# Patient Record
Sex: FEMALE | Race: WHITE | Employment: UNEMPLOYED | URBAN - METROPOLITAN AREA
[De-identification: names, ages, dates, MRNs, and addresses within clinical notes are randomized per-mention and may not be internally consistent; named-entity substitution may affect disease eponyms.]

---

## 2017-02-13 ENCOUNTER — OFFICE VISIT (OUTPATIENT)
Dept: FAMILY MEDICINE CLINIC | Age: 67
End: 2017-02-13

## 2017-02-13 VITALS
SYSTOLIC BLOOD PRESSURE: 130 MMHG | WEIGHT: 192.4 LBS | DIASTOLIC BLOOD PRESSURE: 70 MMHG | TEMPERATURE: 98.1 F | BODY MASS INDEX: 33.55 KG/M2 | HEART RATE: 101 BPM

## 2017-02-13 DIAGNOSIS — L03.116 CELLULITIS OF LEFT LOWER EXTREMITY: Primary | ICD-10-CM

## 2017-02-13 DIAGNOSIS — I87.2 CHRONIC VENOUS INSUFFICIENCY: ICD-10-CM

## 2017-02-13 PROCEDURE — 99213 OFFICE O/P EST LOW 20 MIN: CPT | Performed by: FAMILY MEDICINE

## 2017-02-13 RX ORDER — FLUCONAZOLE 150 MG/1
150 TABLET ORAL ONCE
Qty: 1 TABLET | Refills: 0 | Status: SHIPPED | OUTPATIENT
Start: 2017-02-13 | End: 2017-02-13

## 2017-02-13 RX ORDER — CLOTRIMAZOLE AND BETAMETHASONE DIPROPIONATE 10; .64 MG/G; MG/G
CREAM TOPICAL 2 TIMES DAILY
COMMUNITY
End: 2018-02-23

## 2017-02-13 ASSESSMENT — ENCOUNTER SYMPTOMS
DIARRHEA: 0
EYES NEGATIVE: 1
SHORTNESS OF BREATH: 0
ABDOMINAL PAIN: 0
CHEST TIGHTNESS: 0

## 2017-05-24 RX ORDER — AMITRIPTYLINE HYDROCHLORIDE 25 MG/1
TABLET, FILM COATED ORAL
Qty: 90 TABLET | Refills: 2 | Status: SHIPPED | OUTPATIENT
Start: 2017-05-24 | End: 2018-02-18 | Stop reason: SDUPTHER

## 2017-06-23 ENCOUNTER — OFFICE VISIT (OUTPATIENT)
Dept: FAMILY MEDICINE CLINIC | Age: 67
End: 2017-06-23

## 2017-06-23 VITALS
BODY MASS INDEX: 32.95 KG/M2 | DIASTOLIC BLOOD PRESSURE: 70 MMHG | WEIGHT: 189 LBS | SYSTOLIC BLOOD PRESSURE: 118 MMHG | HEART RATE: 105 BPM | OXYGEN SATURATION: 98 %

## 2017-06-23 DIAGNOSIS — L03.116 CELLULITIS OF LEFT LOWER EXTREMITY: Primary | ICD-10-CM

## 2017-06-23 PROCEDURE — 99213 OFFICE O/P EST LOW 20 MIN: CPT | Performed by: FAMILY MEDICINE

## 2017-06-23 RX ORDER — CIPROFLOXACIN 500 MG/1
500 TABLET, FILM COATED ORAL 2 TIMES DAILY
Qty: 20 TABLET | Refills: 0 | Status: SHIPPED | OUTPATIENT
Start: 2017-06-23 | End: 2017-07-03

## 2017-06-23 RX ORDER — FLUCONAZOLE 200 MG/1
200 TABLET ORAL EVERY OTHER DAY
Qty: 2 TABLET | Refills: 0 | Status: SHIPPED | OUTPATIENT
Start: 2017-06-23 | End: 2017-06-26

## 2017-06-26 LAB
GRAM STAIN RESULT: ABNORMAL
ORGANISM: ABNORMAL
WOUND/ABSCESS: ABNORMAL
WOUND/ABSCESS: ABNORMAL

## 2017-09-06 RX ORDER — FUROSEMIDE 40 MG/1
TABLET ORAL
Qty: 90 TABLET | Refills: 1 | Status: SHIPPED | OUTPATIENT
Start: 2017-09-06 | End: 2018-02-23 | Stop reason: SDUPTHER

## 2018-02-19 RX ORDER — AMITRIPTYLINE HYDROCHLORIDE 25 MG/1
TABLET, FILM COATED ORAL
Qty: 90 TABLET | Refills: 2 | Status: SHIPPED | OUTPATIENT
Start: 2018-02-19 | End: 2018-02-23 | Stop reason: SDUPTHER

## 2018-02-19 NOTE — TELEPHONE ENCOUNTER
Last seen 6/23/2017  No return date  No future appointment  Last labs 5/11/2016      Left message for the patient to call back to schedule an appointment

## 2018-02-23 ENCOUNTER — OFFICE VISIT (OUTPATIENT)
Dept: FAMILY MEDICINE CLINIC | Age: 68
End: 2018-02-23

## 2018-02-23 VITALS
DIASTOLIC BLOOD PRESSURE: 78 MMHG | BODY MASS INDEX: 32.05 KG/M2 | OXYGEN SATURATION: 97 % | WEIGHT: 183.8 LBS | SYSTOLIC BLOOD PRESSURE: 116 MMHG | HEART RATE: 95 BPM

## 2018-02-23 DIAGNOSIS — R73.09 HIGH GLUCOSE: ICD-10-CM

## 2018-02-23 DIAGNOSIS — M54.2 NECK PAIN: Primary | ICD-10-CM

## 2018-02-23 DIAGNOSIS — I87.2 CHRONIC VENOUS INSUFFICIENCY: ICD-10-CM

## 2018-02-23 DIAGNOSIS — M25.511 CHRONIC RIGHT SHOULDER PAIN: ICD-10-CM

## 2018-02-23 DIAGNOSIS — K21.9 GASTROESOPHAGEAL REFLUX DISEASE WITHOUT ESOPHAGITIS: ICD-10-CM

## 2018-02-23 DIAGNOSIS — G89.29 CHRONIC RIGHT SHOULDER PAIN: ICD-10-CM

## 2018-02-23 DIAGNOSIS — K13.79 MOUTH SORES: ICD-10-CM

## 2018-02-23 DIAGNOSIS — E55.9 VITAMIN D DEFICIENCY: ICD-10-CM

## 2018-02-23 DIAGNOSIS — G89.29 CHRONIC LOW BACK PAIN WITHOUT SCIATICA, UNSPECIFIED BACK PAIN LATERALITY: ICD-10-CM

## 2018-02-23 DIAGNOSIS — M54.50 CHRONIC LOW BACK PAIN WITHOUT SCIATICA, UNSPECIFIED BACK PAIN LATERALITY: ICD-10-CM

## 2018-02-23 DIAGNOSIS — Z13.220 LIPID SCREENING: ICD-10-CM

## 2018-02-23 DIAGNOSIS — R60.9 PERIPHERAL EDEMA: ICD-10-CM

## 2018-02-23 DIAGNOSIS — R32 URINARY INCONTINENCE, UNSPECIFIED TYPE: ICD-10-CM

## 2018-02-23 PROCEDURE — G8510 SCR DEP NEG, NO PLAN REQD: HCPCS | Performed by: FAMILY MEDICINE

## 2018-02-23 PROCEDURE — 99214 OFFICE O/P EST MOD 30 MIN: CPT | Performed by: FAMILY MEDICINE

## 2018-02-23 PROCEDURE — 3288F FALL RISK ASSESSMENT DOCD: CPT | Performed by: FAMILY MEDICINE

## 2018-02-23 RX ORDER — AMITRIPTYLINE HYDROCHLORIDE 25 MG/1
TABLET, FILM COATED ORAL
Qty: 90 TABLET | Refills: 2 | Status: SHIPPED | OUTPATIENT
Start: 2018-02-23 | End: 2018-11-18 | Stop reason: SDUPTHER

## 2018-02-23 RX ORDER — DESONIDE 0.5 MG/G
OINTMENT TOPICAL 2 TIMES DAILY
Qty: 2 TUBE | Refills: 3 | Status: SHIPPED | OUTPATIENT
Start: 2018-02-23

## 2018-02-23 RX ORDER — DESONIDE 0.5 MG/G
OINTMENT TOPICAL 2 TIMES DAILY PRN
COMMUNITY
End: 2018-02-23 | Stop reason: SDUPTHER

## 2018-02-23 RX ORDER — GABAPENTIN 600 MG/1
TABLET ORAL
Qty: 180 TABLET | Refills: 3 | Status: SHIPPED | OUTPATIENT
Start: 2018-02-23 | End: 2019-06-10 | Stop reason: SDUPTHER

## 2018-02-23 RX ORDER — OXYBUTYNIN CHLORIDE 10 MG/1
TABLET, EXTENDED RELEASE ORAL
Qty: 90 TABLET | Refills: 2 | Status: SHIPPED | OUTPATIENT
Start: 2018-02-23 | End: 2018-11-18 | Stop reason: SDUPTHER

## 2018-02-23 RX ORDER — FUROSEMIDE 40 MG/1
TABLET ORAL
Qty: 90 TABLET | Refills: 2 | Status: SHIPPED | OUTPATIENT
Start: 2018-02-23 | End: 2018-11-18 | Stop reason: SDUPTHER

## 2018-02-23 ASSESSMENT — ENCOUNTER SYMPTOMS
SHORTNESS OF BREATH: 0
COUGH: 0
ROS SKIN COMMENTS: CHRONIC VENOUS INSUFFICIENCY
CHEST TIGHTNESS: 0
ALLERGIC/IMMUNOLOGIC NEGATIVE: 1
GASTROINTESTINAL NEGATIVE: 1
RESPIRATORY NEGATIVE: 1
EYES NEGATIVE: 1

## 2018-02-23 ASSESSMENT — PATIENT HEALTH QUESTIONNAIRE - PHQ9
1. LITTLE INTEREST OR PLEASURE IN DOING THINGS: 0
2. FEELING DOWN, DEPRESSED OR HOPELESS: 0
SUM OF ALL RESPONSES TO PHQ9 QUESTIONS 1 & 2: 0
SUM OF ALL RESPONSES TO PHQ QUESTIONS 1-9: 0

## 2018-02-23 NOTE — PROGRESS NOTES
Subjective:      Patient ID: Alie De La Paz is a 78 y.o. female. Chief Complaint   Patient presents with    Neck Pain     right side x 6 months    Medication Refill    Shoulder Pain     RT       HPI  RT shoulder & neck area pain x several month  No injury  Hx spinal stenosis back ,stable  Past Medical History:   Diagnosis Date    Breast cancer ([facility] [address] 2008    RT     H/O gastroesophageal reflux (GERD)      Past Surgical History:   Procedure Laterality Date    BREAST SURGERY  2008    Right     CARPAL TUNNEL RELEASE  8/28/12    Left    CARPAL TUNNEL RELEASE  9/20/12    Right     Current Outpatient Prescriptions   Medication Sig Dispense Refill    amitriptyline (ELAVIL) 25 MG tablet TAKE 1 TABLET NIGHTLY 90 tablet 2    furosemide (LASIX) 40 MG tablet TAKE ONE TABLET BY MOUTH ONCE DAILY 90 tablet 2    oxybutynin (DITROPAN-XL) 10 MG extended release tablet TAKE ONE TABLET BY MOUTH ONCE DAILY 90 tablet 2    gabapentin (NEURONTIN) 600 MG tablet TAKE 1 TABLET TWICE A DAY. 180 tablet 3    desonide (DESOWEN) 0.05 % ointment Apply topically 2 times daily Apply topically 2 times daily as needed. 2 Tube 3    triamcinolone (KENALOG) 0.1 % ointment Apply topically 2 times daily 1 Tube 3    ranitidine (ZANTAC) 150 MG tablet Take 150 mg by mouth 2 times daily      omeprazole (PRILOSEC) 20 MG capsule Take 1 capsule by mouth daily. 90 capsule 4     No current facility-administered medications for this visit. Allergies   Allergen Reactions    Neosporin [Neomycin-Polymyx-Gramicid]     Parabens     Polysporin [Bacitracin-Polymyxin B]     Sulfa Antibiotics Hives     History reviewed. No pertinent family history.   Social History     Social History    Marital status:      Spouse name: Talon Sudeep Number of children: 2    Years of education: HS     Occupational History    homemaker           Social History Main Topics    Smoking status: Never Smoker    Smokeless tobacco: Never Used    Alcohol use Nose normal.   Mouth/Throat: Oropharynx is clear and moist. No oropharyngeal exudate. Eyes: Conjunctivae and EOM are normal. Pupils are equal, round, and reactive to light. Neck: Normal range of motion. Neck supple. No thyromegaly present. Cardiovascular: Normal rate, regular rhythm and normal heart sounds. No murmur heard. Pulmonary/Chest: Effort normal and breath sounds normal. She has no wheezes. Abdominal: Soft. There is no tenderness. Musculoskeletal: She exhibits edema. Neck limitation of motion to RT  RT shoulder Limitation abduction   Neurological: She is alert and oriented to person, place, and time. Skin: Skin is warm. She is not diaphoretic. Chronic skin changes lower extremeties due venous insufficiency   Vitals reviewed. /78   Pulse 95   Wt 183 lb 12.8 oz (83.4 kg)   SpO2 97%   BMI 32.05 kg/m²       Assessment:      1. Neck pain  MRI Cervical Spine WO Contrast    CBC with Differential   2. Chronic low back pain without sciatica, unspecified back pain laterality  amitriptyline (ELAVIL) 25 MG tablet    gabapentin (NEURONTIN) 600 MG tablet    CBC with Differential   3. Peripheral edema  furosemide (LASIX) 40 MG tablet    Comprehensive Metabolic Panel   4. Chronic venous insufficiency  Comprehensive Metabolic Panel   5. Urinary incontinence, unspecified type  oxybutynin (DITROPAN-XL) 10 MG extended release tablet    Comprehensive Metabolic Panel   6. Mouth sores  desonide (DESOWEN) 0.05 % ointment    triamcinolone (KENALOG) 0.1 % ointment   7. Chronic right shoulder pain  CBC with Differential    Comprehensive Metabolic Panel    Jono Singleton MD (Orthopedic Surgery)   8. Gastroesophageal reflux disease without esophagitis     9. Lipid screening  Lipid Panel   10. Vitamin D deficiency  Vitamin D 25 Hydroxy   11.  High glucose  Hemoglobin A1C           Plan:      A/P as above  C spine MRI  Ortho referral for RT shoulder  Lab up date   Renew Rx for Mail service Pt prefers to mail it

## 2018-02-24 LAB
A/G RATIO: 1.7 (ref 1.1–2.2)
ALBUMIN SERPL-MCNC: 4.5 G/DL (ref 3.4–5)
ALP BLD-CCNC: 84 U/L (ref 40–129)
ALT SERPL-CCNC: 23 U/L (ref 10–40)
ANION GAP SERPL CALCULATED.3IONS-SCNC: 12 MMOL/L (ref 3–16)
AST SERPL-CCNC: 24 U/L (ref 15–37)
BILIRUB SERPL-MCNC: 0.4 MG/DL (ref 0–1)
BUN BLDV-MCNC: 20 MG/DL (ref 7–20)
CALCIUM SERPL-MCNC: 9.2 MG/DL (ref 8.3–10.6)
CHLORIDE BLD-SCNC: 102 MMOL/L (ref 99–110)
CHOLESTEROL, TOTAL: 182 MG/DL (ref 0–199)
CO2: 27 MMOL/L (ref 21–32)
CREAT SERPL-MCNC: 0.6 MG/DL (ref 0.6–1.2)
ESTIMATED AVERAGE GLUCOSE: 108.3 MG/DL
GFR AFRICAN AMERICAN: >60
GFR NON-AFRICAN AMERICAN: >60
GLOBULIN: 2.7 G/DL
GLUCOSE BLD-MCNC: 87 MG/DL (ref 70–99)
HBA1C MFR BLD: 5.4 %
HDLC SERPL-MCNC: 56 MG/DL (ref 40–60)
LDL CHOLESTEROL CALCULATED: 115 MG/DL
POTASSIUM SERPL-SCNC: 4.6 MMOL/L (ref 3.5–5.1)
SODIUM BLD-SCNC: 141 MMOL/L (ref 136–145)
TOTAL PROTEIN: 7.2 G/DL (ref 6.4–8.2)
TRIGL SERPL-MCNC: 55 MG/DL (ref 0–150)
VITAMIN D 25-HYDROXY: 40.2 NG/ML
VLDLC SERPL CALC-MCNC: 11 MG/DL

## 2018-02-28 DIAGNOSIS — K21.9 GASTROESOPHAGEAL REFLUX DISEASE WITHOUT ESOPHAGITIS: Primary | ICD-10-CM

## 2018-03-03 ENCOUNTER — HOSPITAL ENCOUNTER (OUTPATIENT)
Dept: MAMMOGRAPHY | Age: 68
Discharge: HOME | End: 2018-03-03
Attending: FAMILY MEDICINE | Admitting: FAMILY MEDICINE

## 2018-03-03 ENCOUNTER — HOSPITAL ENCOUNTER (OUTPATIENT)
Dept: MRI IMAGING | Age: 68
Discharge: HOME | End: 2018-03-03
Attending: FAMILY MEDICINE | Admitting: FAMILY MEDICINE

## 2018-03-03 DIAGNOSIS — M54.2 CERVICALGIA: ICD-10-CM

## 2018-03-03 DIAGNOSIS — M54.2 NECK PAIN: ICD-10-CM

## 2018-03-03 DIAGNOSIS — Z12.31 VISIT FOR SCREENING MAMMOGRAM: ICD-10-CM

## 2018-03-05 LAB
REASON FOR REJECTION: NORMAL
REJECTED TEST: NORMAL

## 2018-03-06 ENCOUNTER — TELEPHONE (OUTPATIENT)
Dept: FAMILY MEDICINE CLINIC | Age: 68
End: 2018-03-06

## 2018-03-15 LAB
BASOPHILS ABSOLUTE: 0.1 K/UL (ref 0–0.2)
BASOPHILS RELATIVE PERCENT: 1.1 %
EOSINOPHILS ABSOLUTE: 0.3 K/UL (ref 0–0.6)
EOSINOPHILS RELATIVE PERCENT: 5 %
HCT VFR BLD CALC: 41.5 % (ref 36–48)
HEMOGLOBIN: 14.1 G/DL (ref 12–16)
LYMPHOCYTES ABSOLUTE: 1.6 K/UL (ref 1–5.1)
LYMPHOCYTES RELATIVE PERCENT: 30.8 %
MCH RBC QN AUTO: 31.5 PG (ref 26–34)
MCHC RBC AUTO-ENTMCNC: 34 G/DL (ref 31–36)
MCV RBC AUTO: 92.9 FL (ref 80–100)
MONOCYTES ABSOLUTE: 0.4 K/UL (ref 0–1.3)
MONOCYTES RELATIVE PERCENT: 8.7 %
NEUTROPHILS ABSOLUTE: 2.8 K/UL (ref 1.7–7.7)
NEUTROPHILS RELATIVE PERCENT: 54.4 %
PDW BLD-RTO: 13.1 % (ref 12.4–15.4)
PLATELET # BLD: 241 K/UL (ref 135–450)
PMV BLD AUTO: 8.7 FL (ref 5–10.5)
RBC # BLD: 4.46 M/UL (ref 4–5.2)
WBC # BLD: 5.1 K/UL (ref 4–11)

## 2018-04-12 ENCOUNTER — TELEPHONE (OUTPATIENT)
Dept: FAMILY MEDICINE CLINIC | Age: 68
End: 2018-04-12

## 2018-04-13 RX ORDER — TRIAMCINOLONE ACETONIDE 0.1 %
PASTE (GRAM) DENTAL
Qty: 15 G | Refills: 1 | Status: SHIPPED | OUTPATIENT
Start: 2018-04-13 | End: 2018-04-20

## 2018-10-03 ENCOUNTER — OFFICE VISIT (OUTPATIENT)
Dept: FAMILY MEDICINE CLINIC | Age: 68
End: 2018-10-03
Payer: MEDICARE

## 2018-10-03 VITALS
WEIGHT: 176 LBS | SYSTOLIC BLOOD PRESSURE: 118 MMHG | DIASTOLIC BLOOD PRESSURE: 78 MMHG | BODY MASS INDEX: 30.69 KG/M2 | HEART RATE: 102 BPM | OXYGEN SATURATION: 98 %

## 2018-10-03 DIAGNOSIS — R35.0 URINARY FREQUENCY: ICD-10-CM

## 2018-10-03 DIAGNOSIS — R63.4 WEIGHT LOSS: ICD-10-CM

## 2018-10-03 DIAGNOSIS — M19.90 ARTHRITIS: Primary | ICD-10-CM

## 2018-10-03 LAB
BILIRUBIN, POC: NORMAL
BLOOD URINE, POC: NORMAL
CLARITY, POC: CLEAR
COLOR, POC: YELLOW
GLUCOSE URINE, POC: NORMAL
KETONES, POC: NORMAL
LEUKOCYTE EST, POC: NORMAL
NITRITE, POC: NORMAL
PH, POC: 7
PROTEIN, POC: NORMAL
SPECIFIC GRAVITY, POC: 1.02
UROBILINOGEN, POC: 0.2

## 2018-10-03 PROCEDURE — 81002 URINALYSIS NONAUTO W/O SCOPE: CPT | Performed by: FAMILY MEDICINE

## 2018-10-03 PROCEDURE — 99213 OFFICE O/P EST LOW 20 MIN: CPT | Performed by: FAMILY MEDICINE

## 2018-10-03 RX ORDER — DICLOFENAC SODIUM 75 MG/1
75 TABLET, DELAYED RELEASE ORAL DAILY
COMMUNITY
End: 2018-10-03 | Stop reason: SDUPTHER

## 2018-10-03 RX ORDER — DICLOFENAC SODIUM 75 MG/1
75 TABLET, DELAYED RELEASE ORAL DAILY
Qty: 30 TABLET | Refills: 0 | Status: SHIPPED | OUTPATIENT
Start: 2018-10-03 | End: 2019-06-10

## 2018-10-03 ASSESSMENT — ENCOUNTER SYMPTOMS
RESPIRATORY NEGATIVE: 1
EYES NEGATIVE: 1

## 2018-10-05 LAB — URINE CULTURE, ROUTINE: NORMAL

## 2018-11-18 DIAGNOSIS — R60.9 PERIPHERAL EDEMA: ICD-10-CM

## 2018-11-18 DIAGNOSIS — R32 URINARY INCONTINENCE, UNSPECIFIED TYPE: ICD-10-CM

## 2018-11-18 DIAGNOSIS — G89.29 CHRONIC LOW BACK PAIN WITHOUT SCIATICA, UNSPECIFIED BACK PAIN LATERALITY: ICD-10-CM

## 2018-11-18 DIAGNOSIS — M54.50 CHRONIC LOW BACK PAIN WITHOUT SCIATICA, UNSPECIFIED BACK PAIN LATERALITY: ICD-10-CM

## 2018-11-19 RX ORDER — OXYBUTYNIN CHLORIDE 10 MG/1
TABLET, EXTENDED RELEASE ORAL
Qty: 90 TABLET | Refills: 2 | Status: SHIPPED | OUTPATIENT
Start: 2018-11-19 | End: 2019-08-18 | Stop reason: SDUPTHER

## 2018-11-19 RX ORDER — FUROSEMIDE 40 MG/1
TABLET ORAL
Qty: 90 TABLET | Refills: 2 | Status: SHIPPED | OUTPATIENT
Start: 2018-11-19 | End: 2019-08-18 | Stop reason: SDUPTHER

## 2018-11-19 RX ORDER — AMITRIPTYLINE HYDROCHLORIDE 25 MG/1
TABLET, FILM COATED ORAL
Qty: 90 TABLET | Refills: 2 | Status: SHIPPED | OUTPATIENT
Start: 2018-11-19 | End: 2019-06-10 | Stop reason: SDUPTHER

## 2018-11-19 NOTE — TELEPHONE ENCOUNTER
Medication:   Requested Prescriptions     Pending Prescriptions Disp Refills    furosemide (LASIX) 40 MG tablet [Pharmacy Med Name: FUROSEMIDE TABS 40MG] 90 tablet 2     Sig: TAKE 1 TABLET ONCE DAILY    oxybutynin (DITROPAN-XL) 10 MG extended release tablet [Pharmacy Med Name: OXYBUTYNIN CL ER TABS 10MG] 90 tablet 2     Sig: TAKE 1 TABLET ONCE DAILY    amitriptyline (ELAVIL) 25 MG tablet [Pharmacy Med Name: AMITRIPTYLINE TABS 25MG] 90 tablet 2     Sig: TAKE 1 TABLET NIGHTLY       Last Filled:      Patient Phone Number: [phone] (home)     Last appt: 10/3/2018   Next appt: Visit date not found    Last BMP:   Lab Results   Component Value Date     02/23/2018    K 4.6 02/23/2018     02/23/2018    CO2 27 02/23/2018    ANIONGAP 12 02/23/2018    GLUCOSE 87 02/23/2018    BUN 20 02/23/2018    CREATININE 0.6 02/23/2018    LABGLOM >60 02/23/2018    LABGLOM 70 08/02/2012    GFRAA >60 02/23/2018    GFRAA >60 08/28/2012    CALCIUM 9.2 02/23/2018      Last CMP:   Lab Results   Component Value Date     02/23/2018    K 4.6 02/23/2018     02/23/2018    CO2 27 02/23/2018    ANIONGAP 12 02/23/2018    GLUCOSE 87 02/23/2018    BUN 20 02/23/2018    CREATININE 0.6 02/23/2018    LABGLOM >60 02/23/2018    LABGLOM 70 08/02/2012    GFRAA >60 02/23/2018    GFRAA >60 08/28/2012    PROT 7.2 02/23/2018    PROT 7.2 08/02/2012    LABALBU 4.5 02/23/2018    AGRATIO 1.7 02/23/2018    BILITOT 0.4 02/23/2018    ALKPHOS 84 02/23/2018    ALT 23 02/23/2018    AST 24 02/23/2018    GLOB 2.7 02/23/2018     Last Renal Function:   Lab Results   Component Value Date     02/23/2018    K 4.6 02/23/2018     02/23/2018    CO2 27 02/23/2018    GLUCOSE 87 02/23/2018    BUN 20 02/23/2018    CREATININE 0.6 02/23/2018    LABALBU 4.5 02/23/2018    CALCIUM 9.2 02/23/2018    GFR >60 08/28/2012    GFRAA >60 02/23/2018    GFRAA >60 08/28/2012     Last Labs DM:   Lab Results   Component Value Date    LABA1C 5.4 02/23/2018     Last

## 2019-03-19 ENCOUNTER — TELEPHONE (OUTPATIENT)
Dept: FAMILY MEDICINE CLINIC | Age: 69
End: 2019-03-19

## 2019-06-10 ENCOUNTER — OFFICE VISIT (OUTPATIENT)
Dept: FAMILY MEDICINE CLINIC | Age: 69
End: 2019-06-10
Payer: MEDICARE

## 2019-06-10 VITALS
OXYGEN SATURATION: 99 % | BODY MASS INDEX: 23.73 KG/M2 | HEIGHT: 64 IN | HEART RATE: 94 BPM | DIASTOLIC BLOOD PRESSURE: 68 MMHG | WEIGHT: 139 LBS | SYSTOLIC BLOOD PRESSURE: 112 MMHG

## 2019-06-10 DIAGNOSIS — E55.9 VITAMIN D DEFICIENCY: ICD-10-CM

## 2019-06-10 DIAGNOSIS — M54.50 CHRONIC LOW BACK PAIN WITHOUT SCIATICA, UNSPECIFIED BACK PAIN LATERALITY: ICD-10-CM

## 2019-06-10 DIAGNOSIS — Z13.1 DIABETES MELLITUS SCREENING: ICD-10-CM

## 2019-06-10 DIAGNOSIS — R63.4 WEIGHT LOSS: ICD-10-CM

## 2019-06-10 DIAGNOSIS — R35.0 URINARY FREQUENCY: ICD-10-CM

## 2019-06-10 DIAGNOSIS — M05.79 RHEUMATOID ARTHRITIS INVOLVING MULTIPLE SITES WITH POSITIVE RHEUMATOID FACTOR (HCC): ICD-10-CM

## 2019-06-10 DIAGNOSIS — M19.90 ARTHRITIS: ICD-10-CM

## 2019-06-10 DIAGNOSIS — Z00.00 MEDICARE ANNUAL WELLNESS VISIT, SUBSEQUENT: Primary | ICD-10-CM

## 2019-06-10 DIAGNOSIS — G89.29 CHRONIC LOW BACK PAIN WITHOUT SCIATICA, UNSPECIFIED BACK PAIN LATERALITY: ICD-10-CM

## 2019-06-10 DIAGNOSIS — M35.00 H/O SJOGREN'S DISEASE (HCC): ICD-10-CM

## 2019-06-10 DIAGNOSIS — Z13.220 LIPID SCREENING: ICD-10-CM

## 2019-06-10 LAB
A/G RATIO: 1.2 (ref 1.1–2.2)
ALBUMIN SERPL-MCNC: 3.6 G/DL (ref 3.4–5)
ALP BLD-CCNC: 68 U/L (ref 40–129)
ALT SERPL-CCNC: 17 U/L (ref 10–40)
ANION GAP SERPL CALCULATED.3IONS-SCNC: 11 MMOL/L (ref 3–16)
AST SERPL-CCNC: 18 U/L (ref 15–37)
BASOPHILS ABSOLUTE: 0.1 K/UL (ref 0–0.2)
BASOPHILS RELATIVE PERCENT: 1.1 %
BILIRUB SERPL-MCNC: <0.2 MG/DL (ref 0–1)
BILIRUBIN, POC: NORMAL
BLOOD URINE, POC: NORMAL
BUN BLDV-MCNC: 19 MG/DL (ref 7–20)
CALCIUM SERPL-MCNC: 8.9 MG/DL (ref 8.3–10.6)
CHLORIDE BLD-SCNC: 104 MMOL/L (ref 99–110)
CHOLESTEROL, TOTAL: 137 MG/DL (ref 0–199)
CLARITY, POC: CLEAR
CO2: 23 MMOL/L (ref 21–32)
COLOR, POC: NORMAL
CREAT SERPL-MCNC: <0.5 MG/DL (ref 0.6–1.2)
EOSINOPHILS ABSOLUTE: 0.6 K/UL (ref 0–0.6)
EOSINOPHILS RELATIVE PERCENT: 9.5 %
GFR AFRICAN AMERICAN: >60
GFR NON-AFRICAN AMERICAN: >60
GLOBULIN: 2.9 G/DL
GLUCOSE BLD-MCNC: 96 MG/DL (ref 70–99)
GLUCOSE URINE, POC: NORMAL
HCT VFR BLD CALC: 36.7 % (ref 36–48)
HDLC SERPL-MCNC: 41 MG/DL (ref 40–60)
HEMOGLOBIN: 12.5 G/DL (ref 12–16)
KETONES, POC: NORMAL
LDL CHOLESTEROL CALCULATED: 85 MG/DL
LEUKOCYTE EST, POC: NORMAL
LYMPHOCYTES ABSOLUTE: 1.2 K/UL (ref 1–5.1)
LYMPHOCYTES RELATIVE PERCENT: 19.5 %
MCH RBC QN AUTO: 31.7 PG (ref 26–34)
MCHC RBC AUTO-ENTMCNC: 34.1 G/DL (ref 31–36)
MCV RBC AUTO: 93.1 FL (ref 80–100)
MONOCYTES ABSOLUTE: 0.6 K/UL (ref 0–1.3)
MONOCYTES RELATIVE PERCENT: 10.7 %
NEUTROPHILS ABSOLUTE: 3.5 K/UL (ref 1.7–7.7)
NEUTROPHILS RELATIVE PERCENT: 59.2 %
NITRITE, POC: NORMAL
PDW BLD-RTO: 13.8 % (ref 12.4–15.4)
PH, POC: 7
PLATELET # BLD: 337 K/UL (ref 135–450)
PMV BLD AUTO: 7.8 FL (ref 5–10.5)
POTASSIUM SERPL-SCNC: 4.3 MMOL/L (ref 3.5–5.1)
PROTEIN, POC: NORMAL
RBC # BLD: 3.94 M/UL (ref 4–5.2)
SODIUM BLD-SCNC: 138 MMOL/L (ref 136–145)
SPECIFIC GRAVITY, POC: 1.02
TOTAL PROTEIN: 6.5 G/DL (ref 6.4–8.2)
TRIGL SERPL-MCNC: 56 MG/DL (ref 0–150)
TSH SERPL DL<=0.05 MIU/L-ACNC: 1.82 UIU/ML (ref 0.27–4.2)
UROBILINOGEN, POC: 0.2
VLDLC SERPL CALC-MCNC: 11 MG/DL
WBC # BLD: 5.9 K/UL (ref 4–11)

## 2019-06-10 PROCEDURE — G0439 PPPS, SUBSEQ VISIT: HCPCS | Performed by: FAMILY MEDICINE

## 2019-06-10 PROCEDURE — 81002 URINALYSIS NONAUTO W/O SCOPE: CPT | Performed by: FAMILY MEDICINE

## 2019-06-10 PROCEDURE — 36415 COLL VENOUS BLD VENIPUNCTURE: CPT | Performed by: FAMILY MEDICINE

## 2019-06-10 RX ORDER — CLOTRIMAZOLE AND BETAMETHASONE DIPROPIONATE 10; .64 MG/G; MG/G
CREAM TOPICAL
Qty: 30 G | Refills: 2 | Status: SHIPPED | OUTPATIENT
Start: 2019-06-10 | End: 2021-05-12

## 2019-06-10 RX ORDER — LEFLUNOMIDE 20 MG/1
20 TABLET ORAL
COMMUNITY
Start: 2019-05-06 | End: 2019-08-26

## 2019-06-10 RX ORDER — OMEPRAZOLE 20 MG/1
20 CAPSULE, DELAYED RELEASE ORAL DAILY
COMMUNITY
End: 2021-12-15

## 2019-06-10 RX ORDER — GABAPENTIN 600 MG/1
TABLET ORAL
Qty: 180 TABLET | Refills: 3 | Status: SHIPPED | OUTPATIENT
Start: 2019-06-10 | End: 2019-08-26

## 2019-06-10 RX ORDER — AMITRIPTYLINE HYDROCHLORIDE 25 MG/1
TABLET, FILM COATED ORAL
Qty: 90 TABLET | Refills: 3 | Status: SHIPPED | OUTPATIENT
Start: 2019-06-10 | End: 2020-07-09

## 2019-06-10 ASSESSMENT — PATIENT HEALTH QUESTIONNAIRE - PHQ9
SUM OF ALL RESPONSES TO PHQ QUESTIONS 1-9: 1
SUM OF ALL RESPONSES TO PHQ QUESTIONS 1-9: 1

## 2019-06-10 ASSESSMENT — ENCOUNTER SYMPTOMS
WHEEZING: 0
SHORTNESS OF BREATH: 0
BACK PAIN: 1
APNEA: 0
RESPIRATORY NEGATIVE: 1
EYES NEGATIVE: 1
COUGH: 0
ALLERGIC/IMMUNOLOGIC NEGATIVE: 1

## 2019-06-10 ASSESSMENT — LIFESTYLE VARIABLES: HOW OFTEN DO YOU HAVE A DRINK CONTAINING ALCOHOL: 0

## 2019-06-10 ASSESSMENT — ANXIETY QUESTIONNAIRES: GAD7 TOTAL SCORE: 0

## 2019-06-10 NOTE — PROGRESS NOTES
SUBJECTIVE:  Patient ID: Leandra Garvin is a 80 y.o. female. Chief Complaint:  Chief Complaint   Patient presents with    Medicare AWV       HPI    80year old female  Dx RA under care Dr Juan Pablo Saunders  Last visit 5-6-2019   Pt is waiting for approval shot   Methotrexate x 6 month now d/c due adverse side effect  Weight loss ? side effect of Rx   Stomach issue she can handle one meal a day     Past Medical History:   Diagnosis Date    Breast cancer ([address] 2008    RT     H/O gastroesophageal reflux (GERD)      Past Surgical History:   Procedure Laterality Date    BREAST SURGERY  2008    Right     CARPAL TUNNEL RELEASE  8/28/12    Left    CARPAL TUNNEL RELEASE  9/20/12    Right     Allergies   Allergen Reactions    Neosporin [Neomycin-Polymyx-Gramicid]     Parabens     Polysporin [Bacitracin-Polymyxin B]     Sulfa Antibiotics Hives     Family History   Problem Relation Age of Onset    Other Mother         RA alive 80year old     Social History     Social History Narrative        No tobacco    No alcohol    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                 Patient Active Problem List   Diagnosis    Incontinence of urine    Chronic back pain    Spinal stenosis    GERD (gastroesophageal reflux disease)    History of breast cancer    Peripheral edema    Chronic venous insufficiency    Pyloric stenosis     Current Outpatient Medications   Medication Sig Dispense Refill    omeprazole (PRILOSEC) 20 MG delayed release capsule Take 20 mg by mouth daily      leflunomide (ARAVA) 20 MG tablet Take 20 mg by mouth      oxybutynin (DITROPAN-XL) 10 MG extended release tablet TAKE 1 TABLET ONCE DAILY 90 tablet 2    amitriptyline (ELAVIL) 25 MG tablet TAKE 1 TABLET NIGHTLY 90 tablet 2    desonide (DESOWEN) 0.05 % ointment Apply topically 2 times daily Apply topically 2 times daily as needed.  2 Tube 3    ranitidine (ZANTAC) 150 MG tablet Take 150 mg by mouth 2 times daily      furosemide (LASIX) 40 MG tablet TAKE 1 TABLET ONCE DAILY 90 tablet 2    diclofenac (VOLTAREN) 75 MG EC tablet Take 1 tablet by mouth daily 30 tablet 0    gabapentin (NEURONTIN) 600 MG tablet TAKE 1 TABLET TWICE A DAY. 180 tablet 3    triamcinolone (KENALOG) 0.1 % ointment Apply topically 2 times daily 1 Tube 3    omeprazole (PRILOSEC) 20 MG capsule Take 1 capsule by mouth daily. 90 capsule 4     No current facility-administered medications for this visit. Lab Results   Component Value Date    WBC 5.1 03/15/2018    HGB 14.1 03/15/2018    HCT 41.5 03/15/2018    MCV 92.9 03/15/2018     03/15/2018     Lab Results   Component Value Date    CHOL 182 02/23/2018    CHOL 196 05/11/2016    CHOL 170 04/27/2015     Lab Results   Component Value Date    TRIG 55 02/23/2018    TRIG 62 05/11/2016    TRIG 73 04/27/2015     Lab Results   Component Value Date    HDL 56 02/23/2018    HDL 56 05/11/2016    HDL 49 04/27/2015     Lab Results   Component Value Date    LDLCALC 115 (H) 02/23/2018    LDLCALC 128 (H) 05/11/2016    LDLCALC 106 (H) 04/27/2015     Lab Results   Component Value Date    LABVLDL 11 02/23/2018    LABVLDL 12 05/11/2016    LABVLDL 15 04/27/2015     Lab Results   Component Value Date    CHOLHDLRATIO 4.2 08/02/2012       Chemistry        Component Value Date/Time     02/23/2018 1550    K 4.6 02/23/2018 1550     02/23/2018 1550    CO2 27 02/23/2018 1550    BUN 20 02/23/2018 1550    CREATININE 0.6 02/23/2018 1550        Component Value Date/Time    CALCIUM 9.2 02/23/2018 1550    ALKPHOS 84 02/23/2018 1550    AST 24 02/23/2018 1550    ALT 23 02/23/2018 1550    BILITOT 0.4 02/23/2018 1550            Review of Systems   Constitutional:        So weak  Weight loss due Rx   HENT:        Dry mouth   Eyes: Negative. Respiratory: Negative. Negative for apnea, cough, shortness of breath and wheezing. Cardiovascular: Negative. Negative for chest pain.         Venous stasis   Gastrointestinal:        Stomach hurt all the time  Weight loss  Zantac  Prilosec   Endocrine: Negative. Genitourinary:        RT lumpectomy 2008   Musculoskeletal: Positive for back pain. RA  Dr Solano Grandchild  Pt get CBD oil through Internet 20 drops daily   OTC Equate tylenol 300 mg with 15 mg caffeine +codeine 8 mg  She takes one bid  Difficult to use hand   Did try Methotrexate x few month  Arava 20 mg daily  Hx Chronic back  Hx epidural   Allergic/Immunologic: Negative. Neurological: Negative for dizziness, light-headedness and headaches. Hematological: Negative. Psychiatric/Behavioral: Negative. Negative for behavioral problems. OBJECTIVE:  /68 (Site: Left Upper Arm, Position: Sitting, Cuff Size: Medium Adult)   Pulse 94   Ht 5' 3.75\" (1.619 m)   Wt 139 lb (63 kg)   SpO2 99%   BMI 24.05 kg/m²   Physical Exam   Constitutional: She is oriented to person, place, and time. No distress. Weight loss about 50 Lb since June 2018  Wt was 176 Oct 2018  Today 139   HENT:   Head: Normocephalic and atraumatic. Right Ear: External ear normal.   Left Ear: External ear normal.   Mouth/Throat: Oropharynx is clear and moist.   Eyes: Pupils are equal, round, and reactive to light. EOM are normal.   Neck: Normal range of motion. Neck supple. Cardiovascular: Normal rate, regular rhythm and normal heart sounds. No murmur heard. Venous stasis   Pulmonary/Chest: Effort normal and breath sounds normal.   Abdominal: Soft. Bowel sounds are normal. She exhibits no distension and no mass. There is no tenderness. There is no guarding. Genitourinary:   Genitourinary Comments: RT breast surgical scar   Musculoskeletal:   Prominent lower sternum   Neurological: She is alert and oriented to person, place, and time. Skin: She is not diaphoretic. Venous stasis  Chronic skin changes lower extremities  Superficial varicose ankle & feet  Toe nails darker color   Psychiatric: She has a normal mood and affect.  Her behavior is normal. Judgment and thought content normal.   Vitals reviewed. ASSESSMENT/PLAN:   Diagnosis Orders   1. Medicare annual wellness visit, subsequent     2. Chronic low back pain without sciatica, unspecified back pain laterality  gabapentin (NEURONTIN) 600 MG tablet    amitriptyline (ELAVIL) 25 MG tablet   3. Arthritis     4. Weight loss  CBC Auto Differential    Comprehensive Metabolic Panel    TSH without Reflex    TSH without Reflex    Comprehensive Metabolic Panel    CBC Auto Differential   5. Diabetes mellitus screening  Hemoglobin A1C    Hemoglobin A1C   6. Lipid screening  Lipid Panel    Lipid Panel   7. Vitamin D deficiency  Vitamin D 25 Hydroxy    Vitamin D 25 Hydroxy   8. Urinary frequency  POCT Urinalysis no Micro   9. Rheumatoid arthritis involving multiple sites with positive rheumatoid factor (HCC)     10.  H/O Sjogren's disease       As above   Due Mammogram  Offered GI consult but Pt declined for now she likes wait for Rheumatology visit  up date & injection  Declined immunization up date due waiting for RA medication

## 2019-06-11 LAB
ESTIMATED AVERAGE GLUCOSE: 119.8 MG/DL
HBA1C MFR BLD: 5.8 %
VITAMIN D 25-HYDROXY: 53.6 NG/ML

## 2019-06-24 ENCOUNTER — HOSPITAL ENCOUNTER (OUTPATIENT)
Dept: MAMMOGRAPHY | Age: 69
Discharge: HOME | End: 2019-06-24
Payer: MEDICARE

## 2019-06-24 DIAGNOSIS — Z12.31 VISIT FOR SCREENING MAMMOGRAM: ICD-10-CM

## 2019-06-24 PROCEDURE — 77063 BREAST TOMOSYNTHESIS BI: CPT

## 2019-08-26 ENCOUNTER — OFFICE VISIT (OUTPATIENT)
Dept: PRIMARY CARE CLINIC | Age: 69
End: 2019-08-26
Payer: MEDICARE

## 2019-08-26 VITALS
HEART RATE: 96 BPM | SYSTOLIC BLOOD PRESSURE: 112 MMHG | WEIGHT: 132 LBS | OXYGEN SATURATION: 97 % | DIASTOLIC BLOOD PRESSURE: 58 MMHG | BODY MASS INDEX: 22.84 KG/M2

## 2019-08-26 DIAGNOSIS — R63.4 WEIGHT LOSS: ICD-10-CM

## 2019-08-26 DIAGNOSIS — G89.29 OTHER CHRONIC PAIN: Primary | ICD-10-CM

## 2019-08-26 DIAGNOSIS — M35.00 SICCA, UNSPECIFIED TYPE (HCC): ICD-10-CM

## 2019-08-26 DIAGNOSIS — M05.9 RHEUMATOID ARTHRITIS WITH POSITIVE RHEUMATOID FACTOR, INVOLVING UNSPECIFIED SITE (HCC): ICD-10-CM

## 2019-08-26 PROCEDURE — 99214 OFFICE O/P EST MOD 30 MIN: CPT | Performed by: FAMILY MEDICINE

## 2019-08-26 RX ORDER — CEVIMELINE HYDROCHLORIDE 30 MG/1
30 CAPSULE ORAL 3 TIMES DAILY
Qty: 90 CAPSULE | Refills: 1 | Status: SHIPPED | OUTPATIENT
Start: 2019-08-26 | End: 2019-12-13 | Stop reason: SDUPTHER

## 2019-08-26 RX ORDER — GABAPENTIN 300 MG/1
300 CAPSULE ORAL 2 TIMES DAILY
Qty: 180 CAPSULE | Refills: 1 | Status: SHIPPED | OUTPATIENT
Start: 2019-08-26 | End: 2020-06-29

## 2019-08-26 ASSESSMENT — ENCOUNTER SYMPTOMS
CHEST TIGHTNESS: 0
WHEEZING: 0
ALLERGIC/IMMUNOLOGIC NEGATIVE: 1
SHORTNESS OF BREATH: 0
EYES NEGATIVE: 1
RESPIRATORY NEGATIVE: 1

## 2019-11-04 ENCOUNTER — OFFICE VISIT (OUTPATIENT)
Dept: PRIMARY CARE CLINIC | Age: 69
End: 2019-11-04
Payer: MEDICARE

## 2019-11-04 VITALS
SYSTOLIC BLOOD PRESSURE: 122 MMHG | DIASTOLIC BLOOD PRESSURE: 70 MMHG | WEIGHT: 134.8 LBS | HEART RATE: 104 BPM | OXYGEN SATURATION: 98 % | BODY MASS INDEX: 23.32 KG/M2

## 2019-11-04 DIAGNOSIS — M05.79 RHEUMATOID ARTHRITIS INVOLVING MULTIPLE SITES WITH POSITIVE RHEUMATOID FACTOR (HCC): ICD-10-CM

## 2019-11-04 DIAGNOSIS — M54.2 NECK PAIN: ICD-10-CM

## 2019-11-04 DIAGNOSIS — R51.9 INTRACTABLE EPISODIC HEADACHE, UNSPECIFIED HEADACHE TYPE: ICD-10-CM

## 2019-11-04 DIAGNOSIS — G89.4 CHRONIC PAIN SYNDROME: Primary | ICD-10-CM

## 2019-11-04 PROCEDURE — 99214 OFFICE O/P EST MOD 30 MIN: CPT | Performed by: FAMILY MEDICINE

## 2019-11-04 RX ORDER — OXYCODONE HYDROCHLORIDE AND ACETAMINOPHEN 5; 325 MG/1; MG/1
1 TABLET ORAL EVERY 6 HOURS PRN
Qty: 28 TABLET | Refills: 0 | Status: SHIPPED | OUTPATIENT
Start: 2019-11-04 | End: 2019-11-11

## 2019-11-04 ASSESSMENT — ENCOUNTER SYMPTOMS
RESPIRATORY NEGATIVE: 1
EYES NEGATIVE: 1

## 2019-11-08 ENCOUNTER — HOSPITAL ENCOUNTER (OUTPATIENT)
Dept: CT IMAGING | Age: 69
Discharge: HOME | End: 2019-11-08
Payer: MEDICARE

## 2019-11-08 DIAGNOSIS — R51.9 INTRACTABLE EPISODIC HEADACHE, UNSPECIFIED HEADACHE TYPE: ICD-10-CM

## 2019-11-08 PROCEDURE — 70450 CT HEAD/BRAIN W/O DYE: CPT

## 2020-06-24 LAB
CREATININE: 0.8 MG/DL
POTASSIUM (K+): 4.5

## 2020-06-29 RX ORDER — GABAPENTIN 300 MG/1
CAPSULE ORAL
Qty: 180 CAPSULE | Refills: 0 | Status: SHIPPED | OUTPATIENT
Start: 2020-06-29 | End: 2020-10-09

## 2020-07-09 RX ORDER — AMITRIPTYLINE HYDROCHLORIDE 25 MG/1
TABLET, FILM COATED ORAL
Qty: 90 TABLET | Refills: 3 | Status: SHIPPED | OUTPATIENT
Start: 2020-07-09 | End: 2021-06-01

## 2020-07-09 NOTE — TELEPHONE ENCOUNTER
Medication:   Requested Prescriptions     Pending Prescriptions Disp Refills    amitriptyline (ELAVIL) 25 MG tablet [Pharmacy Med Name: AMITRIPTYLINE TABS 25MG] 90 tablet 3     Sig: TAKE 1 TABLET NIGHTLY     Last Filled:  6.10.19  Last appt: 11/4/2019   Next appt: 7.13.20  Last OARRS:   RX Monitoring 6/29/2020   Periodic Controlled Substance Monitoring No signs of potential drug abuse or diversion identified.

## 2020-07-13 ENCOUNTER — OFFICE VISIT (OUTPATIENT)
Dept: PRIMARY CARE CLINIC | Age: 70
End: 2020-07-13
Payer: MEDICARE

## 2020-07-13 VITALS
HEART RATE: 99 BPM | DIASTOLIC BLOOD PRESSURE: 60 MMHG | WEIGHT: 149 LBS | TEMPERATURE: 98.2 F | SYSTOLIC BLOOD PRESSURE: 112 MMHG | HEIGHT: 62 IN | OXYGEN SATURATION: 99 % | BODY MASS INDEX: 27.42 KG/M2

## 2020-07-13 PROCEDURE — 90732 PPSV23 VACC 2 YRS+ SUBQ/IM: CPT | Performed by: FAMILY MEDICINE

## 2020-07-13 PROCEDURE — G0009 ADMIN PNEUMOCOCCAL VACCINE: HCPCS | Performed by: FAMILY MEDICINE

## 2020-07-13 PROCEDURE — G0439 PPPS, SUBSEQ VISIT: HCPCS | Performed by: FAMILY MEDICINE

## 2020-07-13 SDOH — ECONOMIC STABILITY: TRANSPORTATION INSECURITY
IN THE PAST 12 MONTHS, HAS LACK OF TRANSPORTATION KEPT YOU FROM MEETINGS, WORK, OR FROM GETTING THINGS NEEDED FOR DAILY LIVING?: NO

## 2020-07-13 SDOH — ECONOMIC STABILITY: FOOD INSECURITY: WITHIN THE PAST 12 MONTHS, THE FOOD YOU BOUGHT JUST DIDN'T LAST AND YOU DIDN'T HAVE MONEY TO GET MORE.: NEVER TRUE

## 2020-07-13 SDOH — ECONOMIC STABILITY: INCOME INSECURITY: HOW HARD IS IT FOR YOU TO PAY FOR THE VERY BASICS LIKE FOOD, HOUSING, MEDICAL CARE, AND HEATING?: NOT HARD AT ALL

## 2020-07-13 SDOH — ECONOMIC STABILITY: FOOD INSECURITY: WITHIN THE PAST 12 MONTHS, YOU WORRIED THAT YOUR FOOD WOULD RUN OUT BEFORE YOU GOT MONEY TO BUY MORE.: NEVER TRUE

## 2020-07-13 ASSESSMENT — PATIENT HEALTH QUESTIONNAIRE - PHQ9
2. FEELING DOWN, DEPRESSED OR HOPELESS: 0
1. LITTLE INTEREST OR PLEASURE IN DOING THINGS: 0
SUM OF ALL RESPONSES TO PHQ QUESTIONS 1-9: 0
SUM OF ALL RESPONSES TO PHQ QUESTIONS 1-9: 0
SUM OF ALL RESPONSES TO PHQ9 QUESTIONS 1 & 2: 0

## 2020-07-13 ASSESSMENT — ENCOUNTER SYMPTOMS
BACK PAIN: 1
ALLERGIC/IMMUNOLOGIC NEGATIVE: 1
SHORTNESS OF BREATH: 0
CHEST TIGHTNESS: 0
ABDOMINAL PAIN: 0
RESPIRATORY NEGATIVE: 1
EYES NEGATIVE: 1
WHEEZING: 0

## 2020-07-13 ASSESSMENT — LIFESTYLE VARIABLES: HOW OFTEN DO YOU HAVE A DRINK CONTAINING ALCOHOL: 0

## 2020-07-13 NOTE — PROGRESS NOTES
SUBJECTIVE:  Patient ID: Char Zamudio is a 77 y.o. female.   Chief Complaint:  Chief Complaint   Patient presents with    Medicare AWV       HPI   77year old female  AWV  Monthly Rheumatology care   Monthly lab  Due Mammogram    Past Medical History:   Diagnosis Date    Breast cancer ([address] 2008    RT     H/O gastroesophageal reflux (GERD)      Past Surgical History:   Procedure Laterality Date    BREAST SURGERY  2008    Right     CARPAL TUNNEL RELEASE  8/28/12    Left    CARPAL TUNNEL RELEASE  9/20/12    Right     Allergies   Allergen Reactions    Neosporin [Neomycin-Polymyx-Gramicid]     Parabens     Polysporin [Bacitracin-Polymyxin B]     Sulfa Antibiotics Hives     Family History   Problem Relation Age of Onset    Other Mother         RA alive 80year old     Social History     Social History Narrative        No tobacco    No alcohol    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         Patient Active Problem List   Diagnosis    Incontinence of urine    Chronic back pain    Spinal stenosis    GERD (gastroesophageal reflux disease)    History of breast cancer    Peripheral edema    Chronic venous insufficiency    Pyloric stenosis    Rheumatoid arthritis involving multiple sites with positive rheumatoid factor (HCC)    H/O Sjogren's disease (HCC)     Current Outpatient Medications   Medication Sig Dispense Refill    amitriptyline (ELAVIL) 25 MG tablet TAKE 1 TABLET NIGHTLY 90 tablet 3    gabapentin (NEURONTIN) 300 MG capsule TAKE 1 CAPSULE BY MOUTH TWO TIMES A DAY  974 capsule 0    Certolizumab Pegol (CIMZIA SC) Inject into the skin every 30 days      oxybutynin (DITROPAN-XL) 10 MG extended release tablet TAKE 1 TABLET ONCE DAILY 90 tablet 3    furosemide (LASIX) 40 MG tablet TAKE 1 TABLET ONCE DAILY 90 tablet 3    omeprazole (PRILOSEC) 20 MG delayed release capsule Take 20 mg by mouth daily      desonide (DESOWEN) 0.05 % ointment Apply topically 2 times daily Apply topically 2 times daily as needed. 2 Tube 3    ranitidine (ZANTAC) 150 MG tablet Take 150 mg by mouth 2 times daily      cevimeline (EVOXAC) 30 MG capsule TAKE 1 CAPSULE BY MOUTH THREE TIMES A DAY  (Patient not taking: Reported on 7/13/2020) 90 capsule 2    clotrimazole-betamethasone (LOTRISONE) 1-0.05 % cream Apply topically 2 times daily. (Patient not taking: Reported on 7/13/2020) 30 g 2    triamcinolone (KENALOG) 0.1 % ointment Apply topically 2 times daily (Patient not taking: Reported on 7/13/2020) 1 Tube 3    omeprazole (PRILOSEC) 20 MG capsule Take 1 capsule by mouth daily. 90 capsule 4     No current facility-administered medications for this visit. Lab Results   Component Value Date    WBC 5.9 06/10/2019    HGB 12.5 06/10/2019    HCT 36.7 06/10/2019    MCV 93.1 06/10/2019     06/10/2019     Lab Results   Component Value Date    CHOL 137 06/10/2019    CHOL 182 02/23/2018    CHOL 196 05/11/2016     Lab Results   Component Value Date    TRIG 56 06/10/2019    TRIG 55 02/23/2018    TRIG 62 05/11/2016     Lab Results   Component Value Date    HDL 41 06/10/2019    HDL 56 02/23/2018    HDL 56 05/11/2016     Lab Results   Component Value Date    LDLCALC 85 06/10/2019    LDLCALC 115 (H) 02/23/2018    LDLCALC 128 (H) 05/11/2016     Lab Results   Component Value Date    LABVLDL 11 06/10/2019    LABVLDL 11 02/23/2018    LABVLDL 12 05/11/2016     Lab Results   Component Value Date    CHOLHDLRATIO 4.2 08/02/2012       Chemistry        Component Value Date/Time     06/10/2019 1516    K 4.3 06/10/2019 1516     06/10/2019 1516    CO2 23 06/10/2019 1516    BUN 19 06/10/2019 1516    CREATININE <0.5 (L) 06/10/2019 1516        Component Value Date/Time    CALCIUM 8.9 06/10/2019 1516    ALKPHOS 68 06/10/2019 1516    AST 18 06/10/2019 1516    ALT 17 06/10/2019 1516    BILITOT <0.2 06/10/2019 1516            Review of Systems   Constitutional: Negative. 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 Eyes: Negative.          Due eye check   Respiratory: Negative. Negative for chest tightness, shortness of breath and wheezing. Cardiovascular: Positive for leg swelling. Negative for chest pain and palpitations. Gastrointestinal: Negative for abdominal pain. BM regular  Tums  Mylanta  Pepcid  Prilosec   Endocrine: Negative. Genitourinary: Negative for dysuria and flank pain. Mammogram is due   Musculoskeletal: Positive for arthralgias, back pain and joint swelling. Negative for gait problem, myalgias and neck stiffness. Rheumatology care seen 6-2020 Dr Molly Khan  Infusion monthly   Osteoarthritis Hand  Brusitis Left elbow  Ride Bike out door Retro bike foot brake   Skin:        Itchy skin both lower extremities & arm pit   OTC Benadryl   Allergic/Immunologic: Negative. Neurological: Negative. Negative for dizziness, light-headedness and headaches. Hematological: Negative. Psychiatric/Behavioral: Negative for behavioral problems and sleep disturbance. The patient is not nervous/anxious. OBJECTIVE:  /60 (Site: Left Upper Arm, Position: Sitting, Cuff Size: Medium Adult)   Pulse 99   Temp 98.2 °F (36.8 °C) (Infrared)   Ht 5' 2\" (1.575 m)   Wt 149 lb (67.6 kg)   SpO2 99%   BMI 27.25 kg/m²   Physical Exam  Constitutional:       General: She is not in acute distress. Appearance: Normal appearance. She is not ill-appearing. HENT:      Head: Normocephalic. Eyes:      Extraocular Movements: Extraocular movements intact. Pupils: Pupils are equal, round, and reactive to light. Neck:      Musculoskeletal: Normal range of motion and neck supple. Cardiovascular:      Rate and Rhythm: Normal rate and regular rhythm. Pulmonary:      Effort: Pulmonary effort is normal.      Breath sounds: Normal breath sounds. Musculoskeletal:      Right lower leg: No edema. Left lower leg: No edema.    Skin:     Comments: Hype rpigmention  Both lower extremities  Varicosities superficial Neurological:      General: No focal deficit present. Mental Status: She is alert and oriented to person, place, and time. Psychiatric:         Mood and Affect: Mood normal.         Behavior: Behavior normal.         Thought Content: Thought content normal.         Judgment: Judgment normal.         ASSESSMENT/PLAN:      Diagnosis Orders   1. Medicare annual wellness visit, subsequent     2. Ashish Stuart MD, Gynecology, [facility]   3. Other pruritus  [facility]   4. Venous insufficiency  [facility]   5. High glucose level  Hemoglobin A1C   6.  Need for pneumococcal vaccination  Pneumococcal polysaccharide vaccine 23-valent greater than or equal to 3yo subcutaneous/IM       As above  A1C get it with next lab  [facility] done [facility] @[facility]  Due for Mammogram  Referral as above

## 2020-07-22 LAB
AVERAGE GLUCOSE: NORMAL
AVERAGE GLUCOSE: NORMAL
HBA1C MFR BLD: 5 %
HBA1C MFR BLD: 5 %

## 2020-07-31 ENCOUNTER — HOSPITAL ENCOUNTER (OUTPATIENT)
Dept: MAMMOGRAPHY | Age: 70
Discharge: HOME | End: 2020-07-31
Payer: MEDICARE

## 2020-07-31 PROCEDURE — 77063 BREAST TOMOSYNTHESIS BI: CPT

## 2020-08-13 ENCOUNTER — OFFICE VISIT (OUTPATIENT)
Dept: GYNECOLOGY | Age: 70
End: 2020-08-13
Payer: MEDICARE

## 2020-08-13 VITALS — TEMPERATURE: 98.2 F | HEIGHT: 62 IN | BODY MASS INDEX: 28.16 KG/M2 | RESPIRATION RATE: 18 BRPM | WEIGHT: 153 LBS

## 2020-08-13 PROCEDURE — G0101 CA SCREEN;PELVIC/BREAST EXAM: HCPCS | Performed by: OBSTETRICS & GYNECOLOGY

## 2020-08-13 SDOH — HEALTH STABILITY: MENTAL HEALTH: HOW OFTEN DO YOU HAVE A DRINK CONTAINING ALCOHOL?: NEVER

## 2020-08-13 ASSESSMENT — ENCOUNTER SYMPTOMS
BACK PAIN: 0
TROUBLE SWALLOWING: 0
PHOTOPHOBIA: 0
WHEEZING: 0
DIARRHEA: 0
APNEA: 0
VOMITING: 0
BLOOD IN STOOL: 0
ANAL BLEEDING: 0
ABDOMINAL DISTENTION: 0
CONSTIPATION: 0
ABDOMINAL PAIN: 0
CHEST TIGHTNESS: 0
COLOR CHANGE: 0
RECTAL PAIN: 0
SHORTNESS OF BREATH: 0
SORE THROAT: 0
NAUSEA: 0
COUGH: 0

## 2020-08-13 NOTE — PROGRESS NOTES
Annual GYN Visit    Reinaldo London  Date ofBirth:  1950    Date of Service:  2020    Chief Complaint:   Reinaldo London is a 77 y.o. [de-identified]  female who presents for routine annual gynecologic visit. HPI:  Patient is postmenopausal- She is here for routine annual exam. Denies postmenopausal bleeding.  She is  for past 39 years but not sexually active    Health Maintenance   Topic Date Due    A1C test (Diabetic or Prediabetic)  06/10/2020    Flu vaccine (1) 2020    Colon cancer screen colonoscopy  2021    Potassium monitoring  2021    Creatinine monitoring  2021    Annual Wellness Visit (AWV)  2021    Breast cancer screen  2021    Lipid screen  06/10/2024    DTaP/Tdap/Td vaccine (2 - Td) 2026    DEXA (modify frequency per FRAX score)  Completed    Shingles Vaccine  Completed    Pneumococcal 65+ years Vaccine  Completed    Hepatitis C screen  Addressed    Hepatitis A vaccine  Aged Out    Hepatitis B vaccine  Aged Out    Hib vaccine  Aged Out    Meningococcal (ACWY) vaccine  Aged Out       Past Medical History:   Diagnosis Date    Breast cancer ([facility] [address]     RT     H/O gastroesophageal reflux (GERD)     Menopause ovarian failure      Past Surgical History:   Procedure Laterality Date    BREAST BIOPSY      BREAST LUMPECTOMY      BREAST SURGERY      Right     CARPAL TUNNEL RELEASE  12    Left    CARPAL TUNNEL RELEASE  12    Right     OB History    Para Term  AB Living   0 0 0 0 0 0   SAB TAB Ectopic Molar Multiple Live Births   0 0 0   0     Obstetric Comments   Two adopted daughters   Two G children     Social History     Socioeconomic History    Marital status:      Spouse name: Tia Triplett Number of children: 2    Years of education: HS    Highest education level: Not on file   Occupational History    Occupation: homemaker     Comment:    Social Needs    Financial resource strain: Not Encounters:   07/13/20 112/60   11/04/19 122/70   08/26/19 (!) 112/58     Body mass index is 27.98 kg/m². Physical Exam  Constitutional:       General: She is not in acute distress. Appearance: She is well-developed. HENT:      Head: Normocephalic and atraumatic. Mouth/Throat:      Pharynx: No oropharyngeal exudate. Eyes:      General: No scleral icterus. Right eye: No discharge. Left eye: No discharge. Conjunctiva/sclera: Conjunctivae normal.   Neck:      Thyroid: No thyromegaly. Cardiovascular:      Rate and Rhythm: Normal rate and regular rhythm. Heart sounds: Normal heart sounds. Pulmonary:      Effort: Pulmonary effort is normal.      Breath sounds: Normal breath sounds. Abdominal:      General: Bowel sounds are normal. There is no distension. Palpations: Abdomen is soft. There is no mass. Tenderness: There is no abdominal tenderness. There is no guarding or rebound. Genitourinary:     Labia:         Right: No rash, tenderness, lesion or injury. Left: No rash, tenderness, lesion or injury. Vagina: Normal. No signs of injury and foreign body. No vaginal discharge, erythema or tenderness. Cervix: No cervical motion tenderness, discharge or friability. Uterus: Not deviated, not enlarged, not fixed and not tender. Adnexa:         Right: No mass, tenderness or fullness. Left: No mass, tenderness or fullness. Rectum: No mass or external hemorrhoid. Comments: Age appropriate atrophic changes, cervical stenosis   Skin:     General: Skin is warm. Coloration: Skin is not pale. Findings: No erythema or rash. Neurological:      Mental Status: She is alert. Psychiatric:         Behavior: Behavior normal. Behavior is cooperative. Thought Content: Thought content normal.         Judgment: Judgment normal.           Assessment/Plan:  1.  Well woman exam with routine gynecological exam  - PAP SMEAR  Self breast exam discussed and encouraged  Diet and exercise discussed  Discussed daily multi-vitamin and adequate calcium and vitamin D in diet  Screening mammogram done 07/2020     Return if symptoms worsen or fail to improve, for ANNUAL EXAM.

## 2020-10-09 RX ORDER — GABAPENTIN 300 MG/1
CAPSULE ORAL
Qty: 180 CAPSULE | Refills: 0 | Status: SHIPPED | OUTPATIENT
Start: 2020-10-09 | End: 2021-02-15

## 2020-10-09 NOTE — TELEPHONE ENCOUNTER
Medication:   Requested Prescriptions     Pending Prescriptions Disp Refills    gabapentin (NEURONTIN) 300 MG capsule [Pharmacy Med Name: Gabapentin Oral Capsule 300 MG] 180 capsule 0     Sig: TAKE 1 CAPSULE BY MOUTH TWO TIMES A DAY     Last Filled:  06/29/20    Last appt: 7/13/2020   Next appt: Visit date not found    Last OARRS:   RX Monitoring 6/29/2020   Periodic Controlled Substance Monitoring No signs of potential drug abuse or diversion identified.

## 2020-10-21 ENCOUNTER — TELEPHONE (OUTPATIENT)
Dept: PRIMARY CARE CLINIC | Age: 70
End: 2020-10-21

## 2020-10-21 NOTE — TELEPHONE ENCOUNTER
----- Message from Stepan Gao sent at 10/21/2020 10:57 AM EDT -----  Subject: Message to Provider    QUESTIONS  Information for Provider? pt called stating that she needs orders for   covid testing sent to [facility] so she can get the test done. please advise.  ---------------------------------------------------------------------------  --------------  CALL BACK INFO  What is the best way for the office to contact you? OK to leave message on   voicemail  Preferred Call Back Phone Number? [phone]  ---------------------------------------------------------------------------  --------------  SCRIPT ANSWERS  Relationship to Patient?  Self

## 2020-10-23 LAB
HB: SOURCE: NORMAL
INDICATION FOR TESTING: NORMAL
SARS-COV-2: NEGATIVE

## 2020-10-30 ENCOUNTER — OFFICE VISIT (OUTPATIENT)
Dept: PRIMARY CARE CLINIC | Age: 70
End: 2020-10-30
Payer: MEDICARE

## 2020-10-30 VITALS
RESPIRATION RATE: 16 BRPM | SYSTOLIC BLOOD PRESSURE: 100 MMHG | DIASTOLIC BLOOD PRESSURE: 84 MMHG | WEIGHT: 160.6 LBS | TEMPERATURE: 97.2 F | HEART RATE: 86 BPM | OXYGEN SATURATION: 98 % | BODY MASS INDEX: 29.37 KG/M2

## 2020-10-30 PROCEDURE — 99214 OFFICE O/P EST MOD 30 MIN: CPT | Performed by: FAMILY MEDICINE

## 2020-10-30 RX ORDER — FAMOTIDINE 10 MG
10 TABLET ORAL 2 TIMES DAILY
COMMUNITY

## 2020-10-30 ASSESSMENT — ENCOUNTER SYMPTOMS
DIARRHEA: 1
CHEST TIGHTNESS: 0
ALLERGIC/IMMUNOLOGIC NEGATIVE: 1
SORE THROAT: 0
WHEEZING: 0
EYES NEGATIVE: 1
VOMITING: 0
ABDOMINAL PAIN: 1
RESPIRATORY NEGATIVE: 1
SINUS PRESSURE: 0
NAUSEA: 1

## 2020-10-30 NOTE — PROGRESS NOTES
SUBJECTIVE:  Patient ID: Skye Ruffin is a 72 y.o. female.   Chief Complaint:  Chief Complaint   Patient presents with    Follow-Up from Hospital       HPI   72year old Female  [address] for C diff & Colitis  BM is still loose   Sense abdominal bloating  Rheumatology care     Past Medical History:   Diagnosis Date    Breast cancer ([address] 2008    RT     H/O gastroesophageal reflux (GERD)     Menopause ovarian failure      Past Surgical History:   Procedure Laterality Date    BREAST BIOPSY      BREAST LUMPECTOMY      BREAST SURGERY  2008    Right     CARPAL TUNNEL RELEASE  8/28/12    Left    CARPAL TUNNEL RELEASE  9/20/12    Right     Allergies   Allergen Reactions    Neosporin [Neomycin-Polymyx-Gramicid]     Parabens     Polysporin [Bacitracin-Polymyxin B]     Sulfa Antibiotics Hives     Family History   Problem Relation Age of Onset    Other Mother         RA alive 80year old     Social History     Social History Narrative        No tobacco    No alcohol    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       Patient Active Problem List   Diagnosis    Incontinence of urine    Chronic back pain    Spinal stenosis    GERD (gastroesophageal reflux disease)    History of breast cancer    Peripheral edema    Chronic venous insufficiency    Pyloric stenosis    Rheumatoid arthritis involving multiple sites with positive rheumatoid factor (HCC)    H/O Sjogren's disease (HCC)     Current Outpatient Medications   Medication Sig Dispense Refill    tocilizumab (ACTEMRA) 200 MG/10ML SOLN Infuse 480 mg intravenously      famotidine (PEPCID) 10 MG tablet Take 10 mg by mouth 2 times daily      gabapentin (NEURONTIN) 300 MG capsule TAKE 1 CAPSULE BY MOUTH TWO TIMES A DAY  180 capsule 0    oxybutynin (DITROPAN-XL) 10 MG extended release tablet TAKE 1 TABLET ONCE DAILY 90 tablet 2    furosemide (LASIX) 40 MG tablet TAKE 1 TABLET ONCE DAILY 90 tablet 2    ANDRAE, ZINGIBER OFFICINALIS, PO Take 1 tablet by mouth daily      amitriptyline (ELAVIL) 25 MG tablet TAKE 1 TABLET NIGHTLY 90 tablet 3    omeprazole (PRILOSEC) 20 MG delayed release capsule Take 20 mg by mouth daily      desonide (DESOWEN) 0.05 % ointment Apply topically 2 times daily Apply topically 2 times daily as needed. 2 Tube 3    cevimeline (EVOXAC) 30 MG capsule TAKE 1 CAPSULE BY MOUTH THREE TIMES A DAY  (Patient not taking: Reported on 7/13/2020) 90 capsule 2    Certolizumab Pegol (CIMZIA SC) Inject into the skin every 30 days      clotrimazole-betamethasone (LOTRISONE) 1-0.05 % cream Apply topically 2 times daily. (Patient not taking: Reported on 7/13/2020) 30 g 2    triamcinolone (KENALOG) 0.1 % ointment Apply topically 2 times daily (Patient not taking: Reported on 7/13/2020) 1 Tube 3    ranitidine (ZANTAC) 150 MG tablet Take 150 mg by mouth 2 times daily      omeprazole (PRILOSEC) 20 MG capsule Take 1 capsule by mouth daily. 90 capsule 4     No current facility-administered medications for this visit.       Lab Results   Component Value Date    WBC 5.9 06/10/2019    HGB 12.5 06/10/2019    HCT 36.7 06/10/2019    MCV 93.1 06/10/2019     06/10/2019     Lab Results   Component Value Date    CHOL 137 06/10/2019    CHOL 182 02/23/2018    CHOL 196 05/11/2016     Lab Results   Component Value Date    TRIG 56 06/10/2019    TRIG 55 02/23/2018    TRIG 62 05/11/2016     Lab Results   Component Value Date    HDL 41 06/10/2019    HDL 56 02/23/2018    HDL 56 05/11/2016     Lab Results   Component Value Date    LDLCALC 85 06/10/2019    LDLCALC 115 (H) 02/23/2018    LDLCALC 128 (H) 05/11/2016     Lab Results   Component Value Date    LABVLDL 11 06/10/2019    LABVLDL 11 02/23/2018    LABVLDL 12 05/11/2016     Lab Results   Component Value Date    CHOLHDLRATIO 4.2 08/02/2012       Chemistry        Component Value Date/Time     06/10/2019 1516    K 4.5 06/24/2020    K 4.3 06/10/2019 1516     06/10/2019 1516    CO2 23 06/10/2019 1516    BUN 19 06/10/2019 1516    CREATININE 0.8 06/24/2020        Component Value Date/Time    CALCIUM 8.9 06/10/2019 1516    ALKPHOS 68 06/10/2019 1516    AST 18 06/10/2019 1516    ALT 17 06/10/2019 1516    BILITOT <0.2 06/10/2019 1516            Review of Systems   Constitutional: Negative. Negative for chills and fever. HENT: Negative. Negative for congestion, sinus pressure and sore throat. Eyes: Negative. Respiratory: Negative. Negative for chest tightness and wheezing. Cardiovascular: Positive for leg swelling. Negative for chest pain and palpitations. Gastrointestinal: Positive for abdominal pain, diarrhea and nausea. Negative for vomiting. Bloating  C diff  Loose stool   Endocrine: Negative. Genitourinary: Negative for dysuria. Musculoskeletal:        Rheumatology care  Infusion  Thumb RT distal phalynx + bump   Skin:        Venous stasis lower leg  All wound are healed   Allergic/Immunologic: Negative. Neurological: Negative for dizziness, light-headedness and headaches. Hematological: Negative. Psychiatric/Behavioral: Negative for behavioral problems. OBJECTIVE:  /84   Pulse 86   Temp 97.2 °F (36.2 °C)   Resp 16   Wt 160 lb 9.6 oz (72.8 kg)   LMP  (LMP Unknown)   SpO2 98%   BMI 29.37 kg/m²   Physical Exam  HENT:      Head: Normocephalic. Mouth/Throat:      Mouth: Mucous membranes are moist.      Pharynx: No oropharyngeal exudate or posterior oropharyngeal erythema. Eyes:      Extraocular Movements: Extraocular movements intact. Pupils: Pupils are equal, round, and reactive to light. Cardiovascular:      Rate and Rhythm: Normal rate and regular rhythm. Pulmonary:      Effort: Pulmonary effort is normal.      Breath sounds: Normal breath sounds. Abdominal:      Tenderness: There is abdominal tenderness. There is no guarding or rebound. Musculoskeletal:      Right lower leg: Edema present. Left lower leg: Edema present.       Comments: Venous stasis   Skin:     Comments: Venous stasis   Neurological:      General: No focal deficit present. Mental Status: She is alert and oriented to person, place, and time. ASSESSMENT/PLAN:      Diagnosis Orders   1. Diarrhea, unspecified type     2.  Abdominal bloating     3. C. difficile colitis       Get visit with Dr Huan Umana   Flu shot done @pharmacy

## 2020-12-22 ENCOUNTER — OFFICE VISIT (OUTPATIENT)
Dept: PRIMARY CARE CLINIC | Age: 70
End: 2020-12-22
Payer: MEDICARE

## 2020-12-22 PROCEDURE — 99211 OFF/OP EST MAY X REQ PHY/QHP: CPT | Performed by: NURSE PRACTITIONER

## 2020-12-23 LAB — SARS-COV-2: NOT DETECTED

## 2020-12-24 ENCOUNTER — ANESTHESIA EVENT (OUTPATIENT)
Dept: ENDOSCOPY | Age: 70
End: 2020-12-24
Payer: MEDICARE

## 2020-12-28 ENCOUNTER — ANESTHESIA (OUTPATIENT)
Dept: ENDOSCOPY | Age: 70
End: 2020-12-28
Payer: MEDICARE

## 2020-12-28 ENCOUNTER — HOSPITAL ENCOUNTER (OUTPATIENT)
Age: 70
Setting detail: OUTPATIENT SURGERY
Discharge: HOME | End: 2020-12-28
Attending: INTERNAL MEDICINE | Admitting: INTERNAL MEDICINE
Payer: MEDICARE

## 2020-12-28 VITALS
HEART RATE: 102 BPM | TEMPERATURE: 97.8 F | SYSTOLIC BLOOD PRESSURE: 123 MMHG | BODY MASS INDEX: 26.58 KG/M2 | RESPIRATION RATE: 18 BRPM | DIASTOLIC BLOOD PRESSURE: 78 MMHG | HEIGHT: 63 IN | OXYGEN SATURATION: 98 % | WEIGHT: 150 LBS

## 2020-12-28 VITALS — OXYGEN SATURATION: 97 % | SYSTOLIC BLOOD PRESSURE: 102 MMHG | DIASTOLIC BLOOD PRESSURE: 55 MMHG

## 2020-12-28 DIAGNOSIS — R13.10 DYSPHAGIA, UNSPECIFIED TYPE: ICD-10-CM

## 2020-12-28 DIAGNOSIS — R19.7 DIARRHEA, UNSPECIFIED TYPE: ICD-10-CM

## 2020-12-28 LAB — C DIFF TOXIN/ANTIGEN: ABNORMAL

## 2020-12-28 PROCEDURE — 3609010300 HC COLONOSCOPY W/BIOPSY SINGLE/MULTIPLE: Performed by: INTERNAL MEDICINE

## 2020-12-28 PROCEDURE — 87505 NFCT AGENT DETECTION GI: CPT

## 2020-12-28 PROCEDURE — 3609017700 HC EGD DILATION GASTRIC/DUODENAL STRICTURE: Performed by: INTERNAL MEDICINE

## 2020-12-28 PROCEDURE — 2500000003 HC RX 250 WO HCPCS: Performed by: NURSE ANESTHETIST, CERTIFIED REGISTERED

## 2020-12-28 PROCEDURE — 2580000003 HC RX 258: Performed by: ANESTHESIOLOGY

## 2020-12-28 PROCEDURE — 87324 CLOSTRIDIUM AG IA: CPT

## 2020-12-28 PROCEDURE — C1726 CATH, BAL DIL, NON-VASCULAR: HCPCS | Performed by: INTERNAL MEDICINE

## 2020-12-28 PROCEDURE — 2709999900 HC NON-CHARGEABLE SUPPLY: Performed by: INTERNAL MEDICINE

## 2020-12-28 PROCEDURE — 87449 NOS EACH ORGANISM AG IA: CPT

## 2020-12-28 PROCEDURE — 3700000000 HC ANESTHESIA ATTENDED CARE: Performed by: INTERNAL MEDICINE

## 2020-12-28 PROCEDURE — 7100000011 HC PHASE II RECOVERY - ADDTL 15 MIN: Performed by: INTERNAL MEDICINE

## 2020-12-28 PROCEDURE — 3700000001 HC ADD 15 MINUTES (ANESTHESIA): Performed by: INTERNAL MEDICINE

## 2020-12-28 PROCEDURE — 7100000010 HC PHASE II RECOVERY - FIRST 15 MIN: Performed by: INTERNAL MEDICINE

## 2020-12-28 PROCEDURE — 88305 TISSUE EXAM BY PATHOLOGIST: CPT

## 2020-12-28 PROCEDURE — 6360000002 HC RX W HCPCS: Performed by: NURSE ANESTHETIST, CERTIFIED REGISTERED

## 2020-12-28 RX ORDER — LIDOCAINE HYDROCHLORIDE 20 MG/ML
INJECTION, SOLUTION EPIDURAL; INFILTRATION; INTRACAUDAL; PERINEURAL PRN
Status: DISCONTINUED | OUTPATIENT
Start: 2020-12-28 | End: 2020-12-28 | Stop reason: SDUPTHER

## 2020-12-28 RX ORDER — PROPOFOL 10 MG/ML
INJECTION, EMULSION INTRAVENOUS CONTINUOUS PRN
Status: DISCONTINUED | OUTPATIENT
Start: 2020-12-28 | End: 2020-12-28 | Stop reason: SDUPTHER

## 2020-12-28 RX ORDER — SODIUM CHLORIDE, SODIUM LACTATE, POTASSIUM CHLORIDE, CALCIUM CHLORIDE 600; 310; 30; 20 MG/100ML; MG/100ML; MG/100ML; MG/100ML
INJECTION, SOLUTION INTRAVENOUS CONTINUOUS
Status: DISCONTINUED | OUTPATIENT
Start: 2020-12-28 | End: 2020-12-28 | Stop reason: HOSPADM

## 2020-12-28 RX ADMIN — SODIUM CHLORIDE, POTASSIUM CHLORIDE, SODIUM LACTATE AND CALCIUM CHLORIDE: 600; 310; 30; 20 INJECTION, SOLUTION INTRAVENOUS at 13:08

## 2020-12-28 RX ADMIN — PROPOFOL 180 MCG/KG/MIN: 10 INJECTION, EMULSION INTRAVENOUS at 13:53

## 2020-12-28 RX ADMIN — LIDOCAINE HYDROCHLORIDE 2.5 ML: 20 INJECTION, SOLUTION EPIDURAL; INFILTRATION; INTRACAUDAL; PERINEURAL at 13:53

## 2020-12-28 ASSESSMENT — PULMONARY FUNCTION TESTS
PIF_VALUE: 1

## 2020-12-28 ASSESSMENT — PAIN DESCRIPTION - DESCRIPTORS: DESCRIPTORS: ACHING;CRAMPING;CONSTANT

## 2020-12-28 ASSESSMENT — PAIN - FUNCTIONAL ASSESSMENT
PAIN_FUNCTIONAL_ASSESSMENT: 0-10
PAIN_FUNCTIONAL_ASSESSMENT: 0-10

## 2020-12-28 NOTE — ANESTHESIA PRE PROCEDURE
Department of Anesthesiology  Preprocedure Note       Name:  Myrna Schaeffer   Age:  72 y.o.  :  1950                                          MRN:  [id number]         Date:  2020      Surgeon: Dyan Pitt):  Kishan Cabrera MD    Procedure: Procedure(s):  COLONOSCOPY/  ESOPHAGOGASTRODUODENOSCOPY    Medications prior to admission:   Prior to Admission medications    Medication Sig Start Date End Date Taking? Authorizing Provider   tocilizumab (ACTEMRA) 200 MG/10ML SOLN Infuse 480 mg intravenously    Historical Provider, MD   famotidine (PEPCID) 10 MG tablet Take 10 mg by mouth 2 times daily    Historical Provider, MD   gabapentin (NEURONTIN) 300 MG capsule TAKE 1 CAPSULE BY MOUTH TWO TIMES A DAY  10/9/20 1/7/21  Paresh Handy MD   oxybutynin (DITROPAN-XL) 10 MG extended release tablet TAKE 1 TABLET ONCE DAILY 20   Paresh Handy MD   furosemide (LASIX) 40 MG tablet TAKE 1 TABLET ONCE DAILY 20   Paresh Handy MD   GINGER, ZINGIBER OFFICINALIS, PO Take 1 tablet by mouth daily    Historical Provider, MD   amitriptyline (ELAVIL) 25 MG tablet TAKE 1 TABLET NIGHTLY 20   Paresh Handy MD   cevimeline ([address]) 30 MG capsule TAKE 1 CAPSULE BY MOUTH THREE TIMES A DAY   Patient not taking: Reported on 19   Paresh Handy MD   Certolizumab Pegol (CIMZIA SC) Inject into the skin every 30 days    Historical Provider, MD   omeprazole (PRILOSEC) 20 MG delayed release capsule Take 20 mg by mouth daily    Historical Provider, MD   clotrimazole-betamethasone (LOTRISONE) 1-0.05 % cream Apply topically 2 times daily. Patient not taking: Reported on 2020 6/10/19   Paresh Handy MD   desonide (DESOWEN) 0.05 % ointment Apply topically 2 times daily Apply topically 2 times daily as needed.  18   Paresh Handy MD   triamcinolone (KENALOG) 0.1 % ointment Apply topically 2 times daily  Patient not taking: Reported on 2020   Paresh Handy MD   ranitidine (ZANTAC) 150 MG tablet Take 150 mg by mouth 2 times daily    Historical Provider, MD   omeprazole (PRILOSEC) 20 MG capsule Take 1 capsule by mouth daily. 11/21/11 11/20/12  Mani Benson MD       Current medications:    No current facility-administered medications for this encounter. Allergies: Allergies   Allergen Reactions    Neosporin [Neomycin-Polymyx-Gramicid]     Parabens     Polysporin [Bacitracin-Polymyxin B]     Sulfa Antibiotics Hives       Problem List:    Patient Active Problem List   Diagnosis Code    Incontinence of urine R32    Chronic back pain M54.9, G89.29    Spinal stenosis M48.00    GERD (gastroesophageal reflux disease) K21.9    History of breast cancer Z85.3    Peripheral edema R60.9    Chronic venous insufficiency I87.2    Pyloric stenosis K31.1    Rheumatoid arthritis involving multiple sites with positive rheumatoid factor ([facility] [address] M05.79    H/O Sjogren's disease ([facility] [address] M35.00       Past Medical History:        Diagnosis Date    Breast cancer ([facility] [address] 2008    RT     H/O gastroesophageal reflux (GERD)     Menopause ovarian failure        Past Surgical History:        Procedure Laterality Date    BREAST BIOPSY      BREAST LUMPECTOMY      BREAST SURGERY  2008    Right     CARPAL TUNNEL RELEASE  8/28/12    Left    CARPAL TUNNEL RELEASE  9/20/12    Right       Social History:    Social History     Tobacco Use    Smoking status: Never Smoker    Smokeless tobacco: Never Used   Substance Use Topics    Alcohol use: Never     Frequency: Never                                Counseling given: Not Answered      Vital Signs (Current): There were no vitals filed for this visit.                                            BP Readings from Last 3 Encounters:   10/30/20 100/84   07/13/20 112/60   11/04/19 122/70       NPO Status:                                                                                 BMI:   Wt Readings from Last 3 Encounters:   10/30/20 160 lb 9.6 oz (72.8 kg)   08/13/20 153 lb (69.4 kg)   07/13/20 149 lb (67.6 kg)     There is no height or weight on file to calculate BMI.    CBC:   Lab Results   Component Value Date    WBC 5.9 06/10/2019    RBC 3.94 06/10/2019    HGB 12.5 06/10/2019    HCT 36.7 06/10/2019    MCV 93.1 06/10/2019    RDW 13.8 06/10/2019     06/10/2019       CMP:   Lab Results   Component Value Date     06/10/2019    K 4.5 06/24/2020    K 4.3 06/10/2019     06/10/2019    CO2 23 06/10/2019    BUN 19 06/10/2019    CREATININE 0.8 06/24/2020    GFRAA >60 06/10/2019    GFRAA >60 08/28/2012    AGRATIO 1.2 06/10/2019    LABGLOM >60 06/10/2019    LABGLOM 70 08/02/2012    GLUCOSE 96 06/10/2019    PROT 6.5 06/10/2019    PROT 7.2 08/02/2012    CALCIUM 8.9 06/10/2019    BILITOT <0.2 06/10/2019    ALKPHOS 68 06/10/2019    AST 18 06/10/2019    ALT 17 06/10/2019       POC Tests: No results for input(s): POCGLU, POCNA, POCK, POCCL, POCBUN, POCHEMO, POCHCT in the last 72 hours. Coags: No results found for: PROTIME, INR, APTT    HCG (If Applicable): No results found for: PREGTESTUR, PREGSERUM, HCG, HCGQUANT     ABGs: No results found for: PHART, PO2ART, RQA8PFO, QDO7BPR, BEART, H1ZHLLCB     Type & Screen (If Applicable):  No results found for: LABABO, LABRH    Drug/Infectious Status (If Applicable):  No results found for: HIV, HEPCAB    COVID-19 Screening (If Applicable):   Lab Results   Component Value Date    COVID19 Not Detected 12/22/2020         Anesthesia Evaluation  Patient summary reviewed history of anesthetic complications (pt woke up during previous colonsocopy  and doesn't  want it to happen  again ):   Airway: Mallampati: I  TM distance: >3 FB   Neck ROM: full  Mouth opening: > = 3 FB Dental: normal exam         Pulmonary:Negative Pulmonary ROS                              Cardiovascular:Negative CV ROS                      Neuro/Psych:                ROS comment: Chronic back pain spinal stenosis GI/Hepatic/Renal:   (+) GERD:,           Endo/Other:    (+) : arthritis: rheumatoid. , .                  ROS comment: Breast Ca 2008     Abdominal:           Vascular:                                        Anesthesia Plan      MAC     ASA 2       Induction: intravenous. Anesthetic plan and risks discussed with patient.                       Rudy Mata MD   12/28/2020

## 2020-12-28 NOTE — H&P
History and Physical / Pre-Sedation Assessment    Baldev Taylor is a 72 y.o. female who presents today for EGD and colonoscopy procedure. PMHx:    Past Medical History:   Diagnosis Date    Breast cancer ([facility] [address] 2008    RT     H/O gastroesophageal reflux (GERD)     Menopause ovarian failure        Medications:    Prior to Admission medications    Medication Sig Start Date End Date Taking? Authorizing Provider   tocilizumab (ACTEMRA) 200 MG/10ML SOLN Infuse 480 mg intravenously    Historical Provider, MD   famotidine (PEPCID) 10 MG tablet Take 10 mg by mouth 2 times daily    Historical Provider, MD   gabapentin (NEURONTIN) 300 MG capsule TAKE 1 CAPSULE BY MOUTH TWO TIMES A DAY  10/9/20 1/7/21  Siri Mendez MD   oxybutynin (DITROPAN-XL) 10 MG extended release tablet TAKE 1 TABLET ONCE DAILY 8/14/20   Siri Mendez MD   furosemide (LASIX) 40 MG tablet TAKE 1 TABLET ONCE DAILY 8/14/20   Siri Mendez MD   GINGER, ZINGIBER OFFICINALIS, PO Take 1 tablet by mouth daily    Historical Provider, MD   amitriptyline (ELAVIL) 25 MG tablet TAKE 1 TABLET NIGHTLY 7/9/20   Siri Mendez MD   omeprazole (PRILOSEC) 20 MG delayed release capsule Take 20 mg by mouth daily    Historical Provider, MD   clotrimazole-betamethasone (LOTRISONE) 1-0.05 % cream Apply topically 2 times daily. Patient not taking: Reported on 7/13/2020 6/10/19   Siri Mendez MD   desonide (DESOWEN) 0.05 % ointment Apply topically 2 times daily Apply topically 2 times daily as needed. 2/23/18   Siri Mendez MD   triamcinolone (KENALOG) 0.1 % ointment Apply topically 2 times daily  Patient not taking: Reported on 7/13/2020 2/23/18   Siri Mendez MD   ranitidine (ZANTAC) 150 MG tablet Take 150 mg by mouth 2 times daily    Historical Provider, MD   omeprazole (PRILOSEC) 20 MG capsule Take 1 capsule by mouth daily. 11/21/11 11/20/12  Siri Mendez MD       Allergies:    Allergies   Allergen Reactions    Neosporin [Neomycin-Polymyx-Gramicid]     Parabens     Polysporin [Bacitracin-Polymyxin B]     Sulfa Antibiotics Hives       PSHx:    Past Surgical History:   Procedure Laterality Date    BREAST BIOPSY      BREAST LUMPECTOMY      BREAST SURGERY  2008    Right     CARPAL TUNNEL RELEASE  8/28/12    Left    CARPAL TUNNEL RELEASE  9/20/12    Right       Social Hx:    Social History     Socioeconomic History    Marital status:      Spouse name: Wellington Smith Number of children: 2    Years of education: HS    Highest education level: Not on file   Occupational History    Occupation: homemaker     Comment:    Social Needs    Financial resource strain: Not hard at all   [employer] insecurity     Worry: Never true     Inability: Never true   [employer] needs     Medical: Not on file     Non-medical: No   Tobacco Use    Smoking status: Never Smoker    Smokeless tobacco: Never Used   Substance and Sexual Activity    Alcohol use: Never     Frequency: Never    Drug use: Never    Sexual activity: Not Currently     Partners: Male   Lifestyle    Physical activity     Days per week: Not on file     Minutes per session: Not on file    Stress: Not on file   Relationships    Social connections     Talks on phone: Not on file     Gets together: Not on file     Attends [employer] service: Not on file     Active member of club or organization: Not on file     Attends meetings of clubs or organizations: Not on file     Relationship status: Not on file    Intimate partner violence     Fear of current or ex partner: Not on file     Emotionally abused: Not on file     Physically abused: Not on file     Forced sexual activity: Not on file   Other Topics Concern    Not on file   Social History Narrative        No tobacco    No alcohol    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       Family Hx:   Family History   Problem Relation Age of Onset    Other Mother         RA alive 80year old       Physical Exam:  Vital Signs: BP 96/71   Pulse 116   Temp 98.2 °F (36.8 °C) (Temporal) Resp 18   Ht 5' 3\" (1.6 m)   Wt 150 lb (68 kg)   LMP  (LMP Unknown)   SpO2 95%   BMI 26.57 kg/m²    Pulmonary: Normal  Cardiac: Normal  Abdomen: Normal    Pre-Procedure Assessment / Plan:  ASA Classification: Class 2 - A normal healthy patient with mild systemic disease  Level of Sedation Plan: Moderate sedation   Mallampati Score: I (soft palate, uvula, fauces, tonsillar pillars visible)  Post Procedure plan: Return to same level of care    Colonoscopy Interval History:  3 or more years since last colonoscopy, Less than 3 years since the patient's last colonoscopy due to medical reasons and Less than 3 years since the patient's last colonoscopy due to system reasons    Medical Reason for Colonoscopy before 3 years last colonoscopy incomplete, last colonoscopy had inadequate prep, piecemeal removal of adenomas and last colonoscopy found greater than 10 adenomas  System Reasons for Colonoscopy before 3 years:     previous colonoscopy report unavailable or unable to locate    I assessed the patient and find that the patient is in satisfactory condition to proceed with the planned procedure and sedation plan. Risks/benefits/alternatives of procedure discussed with patient and any present family members. Risks including, but not limited to: bleeding, perforation, post polypectomy syndrome, splenic injury, need for additional procedures or surgery, risks of anesthesia. Patient understands it is their responsibility to call office for pathology results if they do not hear from my office within 1-2 weeks. All questions answered.     Natali Hutchinson MD  12/28/2020

## 2020-12-28 NOTE — BRIEF OP NOTE
Brief Postoperative Note      Patient: Rosibel Umana  YOB: 1950  MRN: [id number]    Date of Procedure: 12/28/2020    Pre-Op Diagnosis: Dysphagia, unspecified type [R13.10]Diarrhea, unspecified type [R19.7]    Post-Op Diagnosis: Same       Procedure(s):  COLONOSCOPY WITH BIOPSY  EGD DILATION BALLOON  EGD DIAGNOSTIC ONLY    Surgeon(s):  Muna Newberry MD    Assistant:  * No surgical staff found *    Anesthesia: Monitor Anesthesia Care    Estimated Blood Loss (mL): Minimal    Complications: None    Specimens:   ID Type Source Tests Collected by Time Destination   A : sigmond colon bx  Tissue Colon SURGICAL PATHOLOGY Muna Newberry MD 12/28/2020 1434        Implants:  * No implants in log *      Drains: * No LDAs found *    Findings: pyloric stenosis and Proctocolitis    Electronically signed by Graciela Garcia MD on 12/28/2020 at 2:36 PM

## 2020-12-28 NOTE — ANESTHESIA POSTPROCEDURE EVALUATION
Department of Anesthesiology  Postprocedure Note    Patient: Rosalinda Beyer  MRN: [id number]  YOB: 1950  Date of evaluation: 12/28/2020  Time:  3:04 PM     Procedure Summary     Date: 12/28/20 Room / Location: [address] [place] [address] [facility]    Anesthesia Start: 4397 Anesthesia Stop: 7711    Procedures:       COLONOSCOPY WITH BIOPSY (N/A )      EGD DILATION BALLOON (N/A ) Diagnosis:       Dysphagia, unspecified type      Diarrhea, unspecified type      (Dysphagia, unspecified type [R13.10]Diarrhea, unspecified type [R19.7])    Surgeons: Tasneem Castro MD Responsible Provider: Anusha Gardner DO    Anesthesia Type: MAC ASA Status: 2          Anesthesia Type: MAC    Pauly Phase I: Pauly Score: 10    Pauly Phase II: Pauly Score: 9    Last vitals: Reviewed and per EMR flowsheets.        Anesthesia Post Evaluation    Patient location during evaluation: PACU  Patient participation: complete - patient participated  Level of consciousness: awake and alert  Pain score: 0  Airway patency: patent  Nausea & Vomiting: no nausea and no vomiting  Complications: no  Cardiovascular status: hemodynamically stable  Respiratory status: acceptable  Hydration status: stable

## 2020-12-29 LAB — GI BACTERIAL PATHOGENS BY PCR: NORMAL

## 2021-02-15 RX ORDER — GABAPENTIN 300 MG/1
CAPSULE ORAL
Qty: 180 CAPSULE | Refills: 0 | Status: SHIPPED | OUTPATIENT
Start: 2021-02-15 | End: 2021-05-12 | Stop reason: SDUPTHER

## 2021-02-15 NOTE — TELEPHONE ENCOUNTER
Medication:   Requested Prescriptions     Pending Prescriptions Disp Refills    gabapentin (NEURONTIN) 300 MG capsule [Pharmacy Med Name: Gabapentin Oral Capsule 300 MG] 180 capsule 0     Sig: TAKE 1 CAPSULE BY MOUTH TWO TIMES A DAY     Last Filled:  10/09/20    Last appt: 10/30/2020   Next appt: Visit date not found    Last OARRS:   RX Monitoring 6/29/2020   Periodic Controlled Substance Monitoring No signs of potential drug abuse or diversion identified.

## 2021-03-16 ENCOUNTER — TELEPHONE (OUTPATIENT)
Dept: PRIMARY CARE CLINIC | Age: 71
End: 2021-03-16

## 2021-03-16 NOTE — TELEPHONE ENCOUNTER
Pt needed something stronger for her pain. She talked with her rheumatologist and she was able to help her.

## 2021-03-16 NOTE — TELEPHONE ENCOUNTER
Patient would like to have Dr. Albarran Seek give her a call tomorrow when she gets a chance.  (03/17/2021)   Re: Pain relief medication   Thank you

## 2021-05-10 DIAGNOSIS — R32 URINARY INCONTINENCE, UNSPECIFIED TYPE: ICD-10-CM

## 2021-05-10 DIAGNOSIS — R60.9 PERIPHERAL EDEMA: ICD-10-CM

## 2021-05-10 RX ORDER — OXYBUTYNIN CHLORIDE 10 MG/1
TABLET, EXTENDED RELEASE ORAL
Qty: 90 TABLET | Refills: 3 | Status: SHIPPED | OUTPATIENT
Start: 2021-05-10 | End: 2021-12-15

## 2021-05-10 RX ORDER — FUROSEMIDE 40 MG/1
TABLET ORAL
Qty: 90 TABLET | Refills: 3 | Status: SHIPPED | OUTPATIENT
Start: 2021-05-10 | End: 2022-05-06

## 2021-05-10 NOTE — TELEPHONE ENCOUNTER
Medication:   Requested Prescriptions     Pending Prescriptions Disp Refills    oxybutynin (DITROPAN-XL) 10 MG extended release tablet [Pharmacy Med Name: OXYBUTYNIN CHLORIDE ER TABS 10MG] 90 tablet 3     Sig: TAKE 1 TABLET ONCE DAILY    furosemide (LASIX) 40 MG tablet [Pharmacy Med Name: FUROSEMIDE TABS 40MG] 90 tablet 3     Sig: TAKE 1 TABLET ONCE DAILY     Last Filled:  8.14.20  Last appt: 10/30/2020   Next appt: Visit date not found  Left message for return call. Needs ov      Last OARRS:   RX Monitoring 6/29/2020   Periodic Controlled Substance Monitoring No signs of potential drug abuse or diversion identified.

## 2021-05-12 ENCOUNTER — OFFICE VISIT (OUTPATIENT)
Dept: PRIMARY CARE CLINIC | Age: 71
End: 2021-05-12
Payer: MEDICARE

## 2021-05-12 VITALS
HEART RATE: 102 BPM | OXYGEN SATURATION: 98 % | SYSTOLIC BLOOD PRESSURE: 118 MMHG | DIASTOLIC BLOOD PRESSURE: 70 MMHG | BODY MASS INDEX: 26.93 KG/M2 | WEIGHT: 152 LBS

## 2021-05-12 DIAGNOSIS — M48.061 SPINAL STENOSIS OF LUMBAR REGION, UNSPECIFIED WHETHER NEUROGENIC CLAUDICATION PRESENT: ICD-10-CM

## 2021-05-12 DIAGNOSIS — R32 URINARY INCONTINENCE, UNSPECIFIED TYPE: ICD-10-CM

## 2021-05-12 DIAGNOSIS — G89.29 CHRONIC BILATERAL LOW BACK PAIN, UNSPECIFIED WHETHER SCIATICA PRESENT: Primary | ICD-10-CM

## 2021-05-12 DIAGNOSIS — M43.16 SPONDYLOLISTHESIS OF LUMBAR REGION: ICD-10-CM

## 2021-05-12 DIAGNOSIS — M54.50 CHRONIC BILATERAL LOW BACK PAIN, UNSPECIFIED WHETHER SCIATICA PRESENT: Primary | ICD-10-CM

## 2021-05-12 DIAGNOSIS — Z86.19 HISTORY OF CLOSTRIDIOIDES DIFFICILE INFECTION: ICD-10-CM

## 2021-05-12 DIAGNOSIS — I87.2 VENOUS STASIS DERMATITIS OF BOTH LOWER EXTREMITIES: ICD-10-CM

## 2021-05-12 DIAGNOSIS — K21.9 GASTROESOPHAGEAL REFLUX DISEASE WITHOUT ESOPHAGITIS: ICD-10-CM

## 2021-05-12 PROCEDURE — 99214 OFFICE O/P EST MOD 30 MIN: CPT | Performed by: FAMILY MEDICINE

## 2021-05-12 RX ORDER — GABAPENTIN 300 MG/1
CAPSULE ORAL
Qty: 180 CAPSULE | Refills: 2 | Status: SHIPPED | OUTPATIENT
Start: 2021-05-12 | End: 2022-06-03 | Stop reason: SDUPTHER

## 2021-05-12 ASSESSMENT — PATIENT HEALTH QUESTIONNAIRE - PHQ9
1. LITTLE INTEREST OR PLEASURE IN DOING THINGS: 0
SUM OF ALL RESPONSES TO PHQ9 QUESTIONS 1 & 2: 0
SUM OF ALL RESPONSES TO PHQ QUESTIONS 1-9: 0
SUM OF ALL RESPONSES TO PHQ QUESTIONS 1-9: 0
2. FEELING DOWN, DEPRESSED OR HOPELESS: 0

## 2021-05-12 ASSESSMENT — ENCOUNTER SYMPTOMS
SHORTNESS OF BREATH: 0
BACK PAIN: 1
CHEST TIGHTNESS: 0
SORE THROAT: 0
EYES NEGATIVE: 1

## 2021-05-12 NOTE — PROGRESS NOTES
LABVLDL 12 05/11/2016     Lab Results   Component Value Date    CHOLHDLRATIO 4.2 08/02/2012       Chemistry        Component Value Date/Time     06/10/2019 1516    K 4.5 06/24/2020    K 4.3 06/10/2019 1516     06/10/2019 1516    CO2 23 06/10/2019 1516    BUN 19 06/10/2019 1516    CREATININE 0.8 06/24/2020        Component Value Date/Time    CALCIUM 8.9 06/10/2019 1516    ALKPHOS 68 06/10/2019 1516    AST 18 06/10/2019 1516    ALT 17 06/10/2019 1516    BILITOT <0.2 06/10/2019 1516        Lab Results   Component Value Date    TSH 1.82 06/10/2019     Lab Results   Component Value Date    LABA1C 5.0 07/22/2020    LABA1C 5.0 07/22/2020     Lab Results   Component Value Date    .8 06/10/2019         Review of Systems   Constitutional: Positive for fatigue. Negative for chills and fever. 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 Eyes: Negative. Respiratory: Negative for chest tightness and shortness of breath. Cardiovascular: Negative for chest pain and palpitations. Chronic venous sufficiency   Gastrointestinal:        GERD  Rx Pepcid  OTC  Rx Prilosec  OTC  Hx C diff x 4 month  Nov 2020 till March 2021   Endocrine: Negative. Genitourinary:        Urgency  Azo  help   Musculoskeletal: Positive for arthralgias, back pain and myalgias. Hands ,knee & lt shoulder bad pain  Difficult to function  Can't hold stuff  Monthly infusion Rx Actemra  Dr Virginia Irizarry  Chronic Back Dx Spondolysis since 1994 Rx gabapentin   Neurological: Negative for dizziness and headaches. Hematological: Negative. Psychiatric/Behavioral: Negative for behavioral problems, self-injury and sleep disturbance. The patient is not nervous/anxious.           good support   + Sub teacher        OBJECTIVE:  /70 (Site: Right Upper

## 2021-06-01 DIAGNOSIS — G89.29 CHRONIC LOW BACK PAIN WITHOUT SCIATICA, UNSPECIFIED BACK PAIN LATERALITY: ICD-10-CM

## 2021-06-01 DIAGNOSIS — M54.50 CHRONIC LOW BACK PAIN WITHOUT SCIATICA, UNSPECIFIED BACK PAIN LATERALITY: ICD-10-CM

## 2021-06-01 RX ORDER — AMITRIPTYLINE HYDROCHLORIDE 25 MG/1
TABLET, FILM COATED ORAL
Qty: 90 TABLET | Refills: 3 | Status: SHIPPED | OUTPATIENT
Start: 2021-06-01 | End: 2022-05-27

## 2021-06-01 NOTE — TELEPHONE ENCOUNTER
Medication:   Requested Prescriptions     Pending Prescriptions Disp Refills    amitriptyline (ELAVIL) 25 MG tablet [Pharmacy Med Name: AMITRIPTYLINE HCL TABS 25MG] 90 tablet 3     Sig: TAKE 1 TABLET NIGHTLY     Last Filled:  07/09/20    Last appt: 5/12/2021   Next appt: Visit date not found    Last OARRS:   RX Monitoring 6/29/2020   Periodic Controlled Substance Monitoring No signs of potential drug abuse or diversion identified.

## 2021-11-08 LAB — TRIGL SERPL-MCNC: 97 MG/DL

## 2021-11-14 LAB
ALBUMIN SERPL-MCNC: 2.2 G/DL
ALBUMIN SERPL-MCNC: 2.7 G/DL
ALBUMIN SERPL-MCNC: 2.7 G/DL
ALP BLD-CCNC: 99 U/L
ALP BLD-CCNC: NORMAL U/L
ALT SERPL-CCNC: 34 U/L
ALT SERPL-CCNC: NORMAL U/L
ANION GAP SERPL CALCULATED.3IONS-SCNC: 5 MMOL/L
ANION GAP SERPL CALCULATED.3IONS-SCNC: NORMAL MMOL/L
AST SERPL-CCNC: 32 U/L
AST SERPL-CCNC: NORMAL U/L
BASOPHILS ABSOLUTE: 0.1 /ΜL
BASOPHILS ABSOLUTE: 0.1 /ΜL
BASOPHILS RELATIVE PERCENT: 1 %
BASOPHILS RELATIVE PERCENT: 1 %
BILIRUB SERPL-MCNC: 0.5 MG/DL (ref 0.1–1.4)
BILIRUB SERPL-MCNC: NORMAL MG/DL
BUN / CREAT RATIO: NORMAL
BUN BLDV-MCNC: 13 MG/DL
BUN BLDV-MCNC: 13 MG/DL
BUN BLDV-MCNC: 9 MG/DL
CALCIUM SERPL-MCNC: 7.5 MG/DL
CALCIUM SERPL-MCNC: 8.9 MG/DL
CALCIUM SERPL-MCNC: 8.9 MG/DL
CHLORIDE BLD-SCNC: 10 MMOL/L
CHLORIDE BLD-SCNC: 103 MMOL/L
CHLORIDE BLD-SCNC: 106 MMOL/L
CO2: 23 MMOL/L
CO2: 26 MMOL/L
CO2: 26 MMOL/L
CREAT SERPL-MCNC: 0.24 MG/DL
CREAT SERPL-MCNC: 0.45 MG/DL
CREAT SERPL-MCNC: 0.45 MG/DL
EOSINOPHILS ABSOLUTE: 0.1 /ΜL
EOSINOPHILS ABSOLUTE: 0.1 /ΜL
EOSINOPHILS RELATIVE PERCENT: 2 %
EOSINOPHILS RELATIVE PERCENT: 2 %
GFR CALCULATED: NORMAL
GFR CALCULATED: NORMAL
GLUCOSE BLD-MCNC: 92 MG/DL
GLUCOSE BLD-MCNC: 98 MG/DL
GLUCOSE: 98
HCT VFR BLD CALC: 26.3 % (ref 36–46)
HCT VFR BLD CALC: 26.3 % (ref 36–46)
HEMOGLOBIN: 8.8 G/DL (ref 12–16)
HEMOGLOBIN: 8.8 G/DL (ref 12–16)
LYMPHOCYTES ABSOLUTE: 1.5 /ΜL
LYMPHOCYTES ABSOLUTE: 1.5 /ΜL
LYMPHOCYTES RELATIVE PERCENT: 26 %
LYMPHOCYTES RELATIVE PERCENT: 26 %
MCH RBC QN AUTO: 33.2 PG
MCH RBC QN AUTO: 33.2 PG
MCHC RBC AUTO-ENTMCNC: 33.4 G/DL
MCHC RBC AUTO-ENTMCNC: 33.4 G/DL
MCV RBC AUTO: 99.4 FL
MCV RBC AUTO: 99.4 FL
MONOCYTES ABSOLUTE: 0.6 /ΜL
MONOCYTES ABSOLUTE: 0.6 /ΜL
MONOCYTES RELATIVE PERCENT: 10 %
MONOCYTES RELATIVE PERCENT: 10 %
NEUTROPHILS ABSOLUTE: 3.7 /ΜL
NEUTROPHILS ABSOLUTE: 3.7 /ΜL
NEUTROPHILS RELATIVE PERCENT: ABNORMAL
NEUTROPHILS RELATIVE PERCENT: ABNORMAL
PDW BLD-RTO: ABNORMAL %
PDW BLD-RTO: ABNORMAL %
PHOSPHORUS: 3.9 MG/DL
PLATELET # BLD: 598 K/ΜL
PLATELET # BLD: 598 K/ΜL
PMV BLD AUTO: 7.5 FL
PMV BLD AUTO: 7.5 FL
POTASSIUM SERPL-SCNC: 3.6 MMOL/L
POTASSIUM SERPL-SCNC: 4.1 MMOL/L
POTASSIUM SERPL-SCNC: 4.1 MMOL/L
RBC # BLD: 2.64 10^6/ΜL
RBC # BLD: 2.64 10^6/ΜL
SODIUM BLD-SCNC: 134 MMOL/L
TOTAL PROTEIN: 6.6
TOTAL PROTEIN: NORMAL
WBC # BLD: 6 10^3/ML
WBC # BLD: 6 10^3/ML

## 2021-11-19 DIAGNOSIS — Z98.890 S/P EXPLORATORY LAPAROTOMY: Primary | ICD-10-CM

## 2021-11-19 RX ORDER — OXYCODONE HYDROCHLORIDE 5 MG/1
5 TABLET ORAL EVERY 4 HOURS PRN
Qty: 84 TABLET | Refills: 0 | Status: SHIPPED | OUTPATIENT
Start: 2021-11-19 | End: 2021-12-03

## 2021-11-21 PROBLEM — L03.115 CELLULITIS AND ABSCESS OF RIGHT LEG: Status: ACTIVE | Noted: 2021-11-21

## 2021-11-21 PROBLEM — Z98.890 STATUS POST INCISION AND DRAINAGE: Status: ACTIVE | Noted: 2021-11-21

## 2021-11-21 PROBLEM — L02.415 CELLULITIS AND ABSCESS OF RIGHT LEG: Status: ACTIVE | Noted: 2021-11-21

## 2021-11-21 PROBLEM — A40.0 SEPSIS DUE TO GROUP A STREPTOCOCCUS (HCC): Status: ACTIVE | Noted: 2021-11-21

## 2021-12-15 ENCOUNTER — OFFICE VISIT (OUTPATIENT)
Dept: PRIMARY CARE CLINIC | Age: 71
End: 2021-12-15
Payer: MEDICARE

## 2021-12-15 VITALS
OXYGEN SATURATION: 99 % | WEIGHT: 151.6 LBS | TEMPERATURE: 98.4 F | SYSTOLIC BLOOD PRESSURE: 112 MMHG | DIASTOLIC BLOOD PRESSURE: 60 MMHG | BODY MASS INDEX: 26.85 KG/M2 | HEART RATE: 104 BPM

## 2021-12-15 DIAGNOSIS — M05.79 RHEUMATOID ARTHRITIS INVOLVING MULTIPLE SITES WITH POSITIVE RHEUMATOID FACTOR (HCC): ICD-10-CM

## 2021-12-15 DIAGNOSIS — Z23 NEED FOR INFLUENZA VACCINATION: ICD-10-CM

## 2021-12-15 DIAGNOSIS — S81.801A OPEN WOUND OF RIGHT LOWER LEG, INITIAL ENCOUNTER: ICD-10-CM

## 2021-12-15 DIAGNOSIS — M19.91 PRIMARY OSTEOARTHRITIS, UNSPECIFIED SITE: ICD-10-CM

## 2021-12-15 DIAGNOSIS — I87.2 CHRONIC VENOUS INSUFFICIENCY OF LOWER EXTREMITY: Primary | ICD-10-CM

## 2021-12-15 PROCEDURE — G0008 ADMIN INFLUENZA VIRUS VAC: HCPCS | Performed by: FAMILY MEDICINE

## 2021-12-15 PROCEDURE — 99214 OFFICE O/P EST MOD 30 MIN: CPT | Performed by: FAMILY MEDICINE

## 2021-12-15 PROCEDURE — 90694 VACC AIIV4 NO PRSRV 0.5ML IM: CPT | Performed by: FAMILY MEDICINE

## 2021-12-15 RX ORDER — HYDROXYCHLOROQUINE SULFATE 200 MG/1
TABLET, FILM COATED ORAL
COMMUNITY
Start: 2021-12-13

## 2021-12-15 RX ORDER — SUCRALFATE ORAL 1 G/10ML
1 SUSPENSION ORAL
COMMUNITY
Start: 2021-11-15 | End: 2022-04-13

## 2021-12-15 RX ORDER — PANTOPRAZOLE SODIUM 40 MG/1
40 TABLET, DELAYED RELEASE ORAL DAILY
COMMUNITY
Start: 2021-11-15

## 2021-12-15 ASSESSMENT — ENCOUNTER SYMPTOMS
ABDOMINAL PAIN: 0
CHEST TIGHTNESS: 0
WHEEZING: 0
RESPIRATORY NEGATIVE: 1
EYES NEGATIVE: 1
COUGH: 0

## 2021-12-15 NOTE — PROGRESS NOTES
SUBJECTIVE:  Patient ID: Anu Sage is a 80 y.o. female.   Chief Complaint:  Chief Complaint   Patient presents with    Follow-Up from Hospital     Ora Ulcer and flesh eating disease right lower leg       HPI   80year old Female  In with    Hx Chronic Venous stasis + Hx chronic itch both lower leg   OTC Benadryl   Several open wound RT leg   [facility]  [facility]  ICU x 2 weeks  Dx Duodenal ulcer perforation  + surgery   Rt Leg wound cleaning x few times followed by surgery   + wound Vac x 5 weeks  [facility] x 3 weeks   She went Home 12/10 /2021  Wound Care 3 x /week @Home  Headache used take Excedrin  Rheumatology Care Dr Brianne Peoples Infusion was d/c  Currently she did start Rx Plaquenil 12/14/2021     Past Medical History:   Diagnosis Date    Breast cancer ([address] 2008    RT     H/O gastroesophageal reflux (GERD)     Menopause ovarian failure      Past Surgical History:   Procedure Laterality Date    BREAST BIOPSY      BREAST LUMPECTOMY      BREAST SURGERY  2008    Right     CARPAL TUNNEL RELEASE  08/28/2012    Left    CARPAL TUNNEL RELEASE  09/20/2012    Right    COLONOSCOPY N/A 12/28/2020    COLONOSCOPY WITH BIOPSY performed by Atul Alexandre MD at [facility]  10/07/2021    PUD  done @    UPPER GASTROINTESTINAL ENDOSCOPY N/A 12/28/2020    EGD DILATION BALLOON performed by Atul Alexandre MD at [address] History   Problem Relation Age of Onset    Other Mother         RA alive 81year old     Social History     Social History Narrative        No tobacco    No alcohol    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         Patient Active Problem List   Diagnosis    Incontinence of urine    Chronic back pain    Spinal stenosis    GERD (gastroesophageal reflux disease)    History of breast cancer    Peripheral edema    Chronic venous insufficiency    Pyloric stenosis    Rheumatoid arthritis involving multiple sites with positive rheumatoid factor ([facility]    H[facility]O S[facility]ren's disease ([facility]    History of Clostridioides difficile infection    Sepsis due to group A Streptococcus ([facility]    Cellulitis and abscess of right leg    Status post incision and drainage     Current Outpatient Medications   Medication Sig Dispense Refill    pantoprazole (PROTONIX) 40 MG tablet Take 40 mg by mouth daily      hydroxychloroquine (PLAQUENIL) 200 MG tablet       sucralfate (CARAFATE) 1 GM/10ML suspension Take 1 g by mouth 4 times daily (before meals and nightly)      amitriptyline (ELAVIL) 25 MG tablet TAKE 1 TABLET NIGHTLY 90 tablet 3    furosemide (LASIX) 40 MG tablet TAKE 1 TABLET ONCE DAILY 90 tablet 3    famotidine (PEPCID) 10 MG tablet Take 10 mg by mouth 2 times daily       GINGER, ZINGIBER OFFICINALIS, PO Take 1 tablet by mouth daily      desonide (DESOWEN) 0.05 % ointment Apply topically 2 times daily Apply topically 2 times daily as needed. 2 Tube 3    gabapentin (NEURONTIN) 300 MG capsule TAKE 1 CAPSULE BY MOUTH TWO TIMES A  capsule 2     No current facility-administered medications for this visit.      Lab Results   Component Value Date    WBC 5.9 06/10/2019    HGB 12.5 06/10/2019    HCT 36.7 06/10/2019    MCV 93.1 06/10/2019     06/10/2019     Lab Results   Component Value Date    CHOL 137 06/10/2019    CHOL 182 02/23/2018    CHOL 196 05/11/2016     Lab Results   Component Value Date    TRIG 56 06/10/2019    TRIG 55 02/23/2018    TRIG 62 05/11/2016     Lab Results   Component Value Date    HDL 41 06/10/2019    HDL 56 02/23/2018    HDL 56 05/11/2016     Lab Results   Component Value Date    LDLCALC 85 06/10/2019    LDLCALC 115 (H) 02/23/2018    LDLCALC 128 (H) 05/11/2016     Lab Results   Component Value Date    LABVLDL 11 06/10/2019    LABVLDL 11 02/23/2018    LABVLDL 12 05/11/2016     Lab Results   Component Value Date    CHOLHDLRATIO 4.2 08/02/2012       Chemistry        Component Value Date/Time     06/10/2019 1516    K 4.5 06/24/2020 0000    K 4.3 06/10/2019 1516     06/10/2019 1516    CO2 23 06/10/2019 1516    BUN 19 06/10/2019 1516    CREATININE 0.8 06/24/2020 0000        Component Value Date/Time    CALCIUM 8.9 06/10/2019 1516    ALKPHOS 68 06/10/2019 1516    AST 18 06/10/2019 1516    ALT 17 06/10/2019 1516    BILITOT <0.2 06/10/2019 1516            Review of Systems   Constitutional: Negative. Use walker   HENT: Negative. Eyes: Negative. Respiratory: Negative. Negative for cough, chest tightness and wheezing. Cardiovascular: Negative for chest pain, palpitations and leg swelling. Chronic venous stasis Lower Leg Morgan  Recent multiple wound Rt   Gastrointestinal: Negative for abdominal pain. Hx C diff in past  Recent evaluation Infectious disease  Rent surgery for PUD  Dr Kenroy Ochoa surgeon    Endocrine: Negative. Genitourinary: Negative for dysuria, frequency and urgency. Musculoskeletal:        RA  Rheumatology specialist   Neurological: Negative for dizziness and headaches. OBJECTIVE:  /60 (Site: Right Upper Arm, Position: Sitting, Cuff Size: Medium Adult)   Pulse 104   Temp 98.4 °F (36.9 °C) (Oral)   Wt 151 lb 9.6 oz (68.8 kg)   LMP  (LMP Unknown)   SpO2 99%   BMI 26.85 kg/m²   Physical Exam  Constitutional:       Comments: Use walker  In with    HENT:      Head: Normocephalic. Eyes:      Extraocular Movements: Extraocular movements intact. Pupils: Pupils are equal, round, and reactive to light. Cardiovascular:      Rate and Rhythm: Normal rate and regular rhythm. Heart sounds: Normal heart sounds. Comments: RT leg with drainage  Pulmonary:      Effort: Pulmonary effort is normal.      Breath sounds: Normal breath sounds. No wheezing or rhonchi. Abdominal:      Comments: Mid Line surgical scar   Musculoskeletal:      Cervical back: Normal range of motion and neck supple.       Comments: RA  Hand knuckles nodule almost resolved    Neurological:      General: No focal deficit present. Mental Status: She is alert and oriented to person, place, and time. ASSESSMENT/PLAN:      Diagnosis Orders   1. Chronic venous insufficiency of lower extremity     2. Need for influenza vaccination  INFLUENZA, QUADV, ADJUVANTED, 65 YRS =, IM, PF, PREFILL SYR, 0.5ML (FLUAD)   3. Open wound of right lower leg, initial encounter     4. Primary osteoarthritis, unspecified site     5.  Rheumatoid arthritis involving multiple sites with positive rheumatoid factor (HCC)           Covid x 3 done  Flu vaccine today  Venous insufficiency lower leg & open wound Vascular Dr Darwin Sim see specialist Dr [facility]   PUD surgery Dr Kesha Boyd

## 2021-12-22 RX ORDER — TIZANIDINE 4 MG/1
4 TABLET ORAL EVERY 8 HOURS PRN
Qty: 30 TABLET | Refills: 0 | Status: SHIPPED | OUTPATIENT
Start: 2021-12-22 | End: 2022-01-01

## 2022-01-28 ENCOUNTER — TELEPHONE (OUTPATIENT)
Dept: PRIMARY CARE CLINIC | Age: 72
End: 2022-01-28

## 2022-01-28 ENCOUNTER — TELEPHONE (OUTPATIENT)
Dept: INTERNAL MEDICINE CLINIC | Age: 72
End: 2022-01-28

## 2022-01-28 NOTE — TELEPHONE ENCOUNTER
[facility] has been doing a wound vac with this pt for 2 mos now on her lower extremities. She is almost healed. The wound vac dressing is causing irritation for this pt's wound. They are asking for Dr. Brittany Mora to submit an order for wound care other than a wound vac. Please call back.     LOV 12/15/21

## 2022-01-28 NOTE — TELEPHONE ENCOUNTER
Padma Subramanian with [facility] is requesting orders from Dr. Chandrika Kelsey. Padma Subramanian would like an order to DC the Wound Vac and start orders for dressing changes due to skin irritations created from the wound vac dressings. Patient is no longer at [facility] and is now residing at home. Padma Subramanian is asking if Dr. Chandrika Kelsey is still her Provider and if not who should she call?

## 2022-03-09 ENCOUNTER — TELEPHONE (OUTPATIENT)
Dept: PRIMARY CARE CLINIC | Age: 72
End: 2022-03-09

## 2022-03-09 NOTE — TELEPHONE ENCOUNTER
Tyrel Cook from [facility] called and stated Patient is getting wound care. Patient needs two visits a week for four weeks. Tyrel Cook needs a verbal order to be able to do this.  Please call her [phone]

## 2022-03-30 ENCOUNTER — TELEPHONE (OUTPATIENT)
Dept: PRIMARY CARE CLINIC | Age: 72
End: 2022-03-30

## 2022-03-30 NOTE — TELEPHONE ENCOUNTER
Maryann Noble from [facility] called and said the pt is going to wound care once a week. Pt is getting the unna boot once a week. Pt Cellulitis keeps coming back. There is no reason [facility] needs to be there, pt vital signs are stable. [facility] is requesting discharge from services.

## 2022-04-04 ENCOUNTER — TELEPHONE (OUTPATIENT)
Dept: PRIMARY CARE CLINIC | Age: 72
End: 2022-04-04

## 2022-04-04 DIAGNOSIS — R05.9 COUGH: ICD-10-CM

## 2022-04-04 DIAGNOSIS — J18.9 PNEUMONIA DUE TO INFECTIOUS ORGANISM, UNSPECIFIED LATERALITY, UNSPECIFIED PART OF LUNG: Primary | ICD-10-CM

## 2022-04-04 RX ORDER — HYDROCODONE POLISTIREX AND CHLORPHENIRAMINE POLISTIREX 10; 8 MG/5ML; MG/5ML
5 SUSPENSION, EXTENDED RELEASE ORAL EVERY 12 HOURS PRN
Qty: 100 ML | Refills: 0 | Status: SHIPPED | OUTPATIENT
Start: 2022-04-04 | End: 2022-04-13

## 2022-04-04 NOTE — TELEPHONE ENCOUNTER
----- Message from Albino Ramirez sent at 4/4/2022  2:59 PM EDT -----  Subject: Message to Provider    QUESTIONS  Information for Provider? pt has already spoke to office and was told to   wait until net week to allow meds from hospital to kick in---is asking pcp   to call in a script for her cough, is asking for a cough med with codeine   ---stts is coughing so much it is making her throw up  ---------------------------------------------------------------------------  --------------  CALL BACK INFO  What is the best way for the office to contact you? OK to leave message on   voicemail  Preferred Call Back Phone Number? [phone]  ---------------------------------------------------------------------------  --------------  SCRIPT ANSWERS  Relationship to Patient?  Self

## 2022-04-06 ENCOUNTER — TELEPHONE (OUTPATIENT)
Dept: PRIMARY CARE CLINIC | Age: 72
End: 2022-04-06

## 2022-04-06 NOTE — TELEPHONE ENCOUNTER
Caity Rivera from [facility] called and stated pt went to ER [facility] recently and was diagnosed with pneumonia. Hosp put her on two antibiotics. [facility] needs a verbal order for one visit, to keep an eye out on her since her ER visit. Pt will be discharged from wound care on Friday after her visit.

## 2022-04-11 ENCOUNTER — OFFICE VISIT (OUTPATIENT)
Dept: PRIMARY CARE CLINIC | Age: 72
End: 2022-04-11
Payer: MEDICARE

## 2022-04-11 VITALS
OXYGEN SATURATION: 98 % | SYSTOLIC BLOOD PRESSURE: 108 MMHG | HEIGHT: 62 IN | BODY MASS INDEX: 26.46 KG/M2 | TEMPERATURE: 97.9 F | DIASTOLIC BLOOD PRESSURE: 68 MMHG | HEART RATE: 100 BPM | WEIGHT: 143.8 LBS

## 2022-04-11 DIAGNOSIS — Z91.81 AT HIGH RISK FOR FALLS: ICD-10-CM

## 2022-04-11 DIAGNOSIS — J15.3 PNEUMONIA OF RIGHT LOWER LOBE DUE TO GROUP B STREPTOCOCCUS (HCC): Primary | ICD-10-CM

## 2022-04-11 DIAGNOSIS — N76.0 ACUTE VAGINITIS: ICD-10-CM

## 2022-04-11 DIAGNOSIS — M05.79 RHEUMATOID ARTHRITIS INVOLVING MULTIPLE SITES WITH POSITIVE RHEUMATOID FACTOR (HCC): ICD-10-CM

## 2022-04-11 DIAGNOSIS — M35.00 HISTORY OF SJOGREN'S DISEASE (HCC): ICD-10-CM

## 2022-04-11 PROCEDURE — 99214 OFFICE O/P EST MOD 30 MIN: CPT | Performed by: FAMILY MEDICINE

## 2022-04-11 RX ORDER — FLUCONAZOLE 150 MG/1
150 TABLET ORAL ONCE
Qty: 1 TABLET | Refills: 0 | Status: SHIPPED | OUTPATIENT
Start: 2022-04-11 | End: 2022-04-11

## 2022-04-11 SDOH — ECONOMIC STABILITY: HOUSING INSECURITY: IN THE LAST 12 MONTHS, HOW MANY PLACES HAVE YOU LIVED?: 1

## 2022-04-11 SDOH — ECONOMIC STABILITY: INCOME INSECURITY: IN THE LAST 12 MONTHS, WAS THERE A TIME WHEN YOU WERE NOT ABLE TO PAY THE MORTGAGE OR RENT ON TIME?: NO

## 2022-04-11 SDOH — ECONOMIC STABILITY: HOUSING INSECURITY
IN THE LAST 12 MONTHS, WAS THERE A TIME WHEN YOU DID NOT HAVE A STEADY PLACE TO SLEEP OR SLEPT IN A SHELTER (INCLUDING NOW)?: NO

## 2022-04-11 SDOH — ECONOMIC STABILITY: TRANSPORTATION INSECURITY
IN THE PAST 12 MONTHS, HAS THE LACK OF TRANSPORTATION KEPT YOU FROM MEDICAL APPOINTMENTS OR FROM GETTING MEDICATIONS?: NO

## 2022-04-11 SDOH — HEALTH STABILITY: PHYSICAL HEALTH: ON AVERAGE, HOW MANY MINUTES DO YOU ENGAGE IN EXERCISE AT THIS LEVEL?: 0 MIN

## 2022-04-11 SDOH — ECONOMIC STABILITY: FOOD INSECURITY: WITHIN THE PAST 12 MONTHS, THE FOOD YOU BOUGHT JUST DIDN'T LAST AND YOU DIDN'T HAVE MONEY TO GET MORE.: NEVER TRUE

## 2022-04-11 SDOH — HEALTH STABILITY: PHYSICAL HEALTH: ON AVERAGE, HOW MANY DAYS PER WEEK DO YOU ENGAGE IN MODERATE TO STRENUOUS EXERCISE (LIKE A BRISK WALK)?: 0 DAYS

## 2022-04-11 SDOH — ECONOMIC STABILITY: FOOD INSECURITY: WITHIN THE PAST 12 MONTHS, YOU WORRIED THAT YOUR FOOD WOULD RUN OUT BEFORE YOU GOT MONEY TO BUY MORE.: NEVER TRUE

## 2022-04-11 ASSESSMENT — SOCIAL DETERMINANTS OF HEALTH (SDOH)
HOW OFTEN DO YOU ATTEND CHURCH OR RELIGIOUS SERVICES?: MORE THAN 4 TIMES PER YEAR
HOW OFTEN DO YOU ATTENT MEETINGS OF THE CLUB OR ORGANIZATION YOU BELONG TO?: NEVER
DO YOU BELONG TO ANY CLUBS OR ORGANIZATIONS SUCH AS CHURCH GROUPS UNIONS, FRATERNAL OR ATHLETIC GROUPS, OR SCHOOL GROUPS?: NO
HOW OFTEN DO YOU GET TOGETHER WITH FRIENDS OR RELATIVES?: MORE THAN THREE TIMES A WEEK
HOW HARD IS IT FOR YOU TO PAY FOR THE VERY BASICS LIKE FOOD, HOUSING, MEDICAL CARE, AND HEATING?: NOT HARD AT ALL
IN A TYPICAL WEEK, HOW MANY TIMES DO YOU TALK ON THE PHONE WITH FAMILY, FRIENDS, OR NEIGHBORS?: MORE THAN THREE TIMES A WEEK

## 2022-04-11 ASSESSMENT — LIFESTYLE VARIABLES: HOW OFTEN DO YOU HAVE A DRINK CONTAINING ALCOHOL: NEVER

## 2022-04-11 NOTE — PROGRESS NOTES
SUBJECTIVE:  Patient ID: Virgilio Franco is a 80 y.o. female. Chief Complaint:  Chief Complaint   Patient presents with    Pneumonia     ER Follow up 1 wk ago       HPI   80year old Female  ER Dx Pneumonia 4/2/2022  Rx Z pk & Rx Vancomycin  Hx C diff  Wound center Dr Miguel Nunez  @[facility] Dr Stanford Ayala   ? Vaginal itch     Past Medical History:   Diagnosis Date    Breast cancer ([facility] 2008    RT     H/O gastroesophageal reflux (GERD)     Menopause ovarian failure      Past Surgical History:   Procedure Laterality Date    BREAST BIOPSY      BREAST LUMPECTOMY      BREAST SURGERY  2008    Right     CARPAL TUNNEL RELEASE  08/28/2012    Left    CARPAL TUNNEL RELEASE  09/20/2012    Right    COLONOSCOPY N/A 12/28/2020    COLONOSCOPY WITH BIOPSY performed by Lawyer Emerita MD at [address] [place] [address]  10/07/2021    PUD  done @    UPPER GASTROINTESTINAL ENDOSCOPY N/A 12/28/2020    EGD DILATION BALLOON performed by Lawyer Emerita MD at [facility] ENDOSCOPY     Allergies   Allergen Reactions    Clindamycin/Lincomycin      C-Diff    Neosporin [Neomycin-Polymyx-Gramicid]     Parabens     Polysporin [Bacitracin-Polymyxin B]     Sulfa Antibiotics Hives       Family History   Problem Relation Age of Onset    Other Mother         RA alive 81year old assist living     Social History     Social History Narrative        No tobacco    No alcohol    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    History of Clostridioides difficile infection    Sepsis due to group A Streptococcus ([facility]    Cellulitis and abscess of right leg    Status post incision and drainage     Current Outpatient Medications   Medication Sig Dispense Refill    pantoprazole (PROTONIX) 40 MG tablet Take 40 mg by mouth daily      hydroxychloroquine (PLAQUENIL) 200 MG tablet       amitriptyline (ELAVIL) 25 MG tablet TAKE 1 TABLET NIGHTLY 90 tablet 3    furosemide (LASIX) 40 MG tablet TAKE 1 TABLET ONCE DAILY 90 tablet 3    famotidine (PEPCID) 10 MG tablet Take 10 mg by mouth 2 times daily       GINGER, ZINGIBER OFFICINALIS, PO Take 1 tablet by mouth daily      desonide (DESOWEN) 0.05 % ointment Apply topically 2 times daily Apply topically 2 times daily as needed. 2 Tube 3    HYDROcodone-chlorpheniramine (TUSSIONEX PENNKINETIC ER) 10-8 MG/5ML SUER Take 5 mLs by mouth every 12 hours as needed (cough) for up to 10 days. 100 mL 0    sucralfate (CARAFATE) 1 GM/10ML suspension Take 1 g by mouth 4 times daily (before meals and nightly)      gabapentin (NEURONTIN) 300 MG capsule TAKE 1 CAPSULE BY MOUTH TWO TIMES A  capsule 2     No current facility-administered medications for this visit.      Lab Results   Component Value Date    WBC 6.0 11/14/2021    WBC 6.0 11/14/2021    HGB 8.8 (A) 11/14/2021    HGB 8.8 (A) 11/14/2021    HCT 26.3 (A) 11/14/2021    HCT 26.3 (A) 11/14/2021    MCV 99.4 11/14/2021    MCV 99.4 11/14/2021     11/14/2021     11/14/2021     Lab Results   Component Value Date    CHOL 137 06/10/2019    CHOL 182 02/23/2018    CHOL 196 05/11/2016     Lab Results   Component Value Date    TRIG 97 11/08/2021    TRIG 56 06/10/2019    TRIG 55 02/23/2018     Lab Results   Component Value Date    HDL 41 06/10/2019    HDL 56 02/23/2018    HDL 56 05/11/2016     Lab Results   Component Value Date    LDLCALC 85 06/10/2019    LDLCALC 115 (H) 02/23/2018    LDLCALC 128 (H) 05/11/2016     Lab Results   Component Value Date    LABVLDL 11 06/10/2019    LABVLDL 11 02/23/2018    LABVLDL 12 05/11/2016     Lab Results   Component Value Date    CHOLHDLRATIO 4.2 08/02/2012       Chemistry        Component Value Date/Time     11/14/2021 0000     11/14/2021 0000     11/14/2021 0000    K 4.1 11/14/2021 0000    K 3.6 11/14/2021 0000    K 4.1 11/14/2021 0000     11/14/2021 0000     11/14/2021 0000    CL 10 11/14/2021 0000    CO2 26 11/14/2021 0000    CO2 23 11/14/2021 0000    CO2 26 11/14/2021 0000    BUN 13 11/14/2021 0000    BUN 9 11/14/2021 0000    BUN 13 11/14/2021 0000    CREATININE 0.45 11/14/2021 0000    CREATININE 0.24 11/14/2021 0000    CREATININE 0.45 11/14/2021 0000    CREATININE 0.8 06/24/2020 0000        Component Value Date/Time    CALCIUM 8.9 11/14/2021 0000    CALCIUM 7.5 11/14/2021 0000    CALCIUM 8.9 11/14/2021 0000    ALKPHOS 99 11/14/2021 0000    AST 32 11/14/2021 0000    ALT 34 11/14/2021 0000    BILITOT 0.5 11/14/2021 0000        Lab Results   Component Value Date    TSH 1.82 06/10/2019     Lab Results   Component Value Date    LABA1C 5.0 07/22/2020    LABA1C 5.0 07/22/2020     Lab Results   Component Value Date    .8 06/10/2019             OBJECTIVE:  /68 (Site: Right Upper Arm, Position: Sitting, Cuff Size: Medium Adult)   Pulse 100   Temp 97.9 °F (36.6 °C) (Infrared)   Ht 5' 2.25\" (1.581 m)   Wt 143 lb 12.8 oz (65.2 kg)   LMP  (LMP Unknown)   SpO2 98%   BMI 26.09 kg/m²   Physical Exam  HENT:      Head: Normocephalic. Eyes:      Extraocular Movements: Extraocular movements intact. Pupils: Pupils are equal, round, and reactive to light. Cardiovascular:      Rate and Rhythm: Normal rate and regular rhythm. Pulses: Normal pulses. Heart sounds: Normal heart sounds. Pulmonary:      Breath sounds: Rhonchi present. Musculoskeletal:      Cervical back: Normal range of motion and neck supple. Skin:     Comments: Venous stasis bilateral   Neurological:      Mental Status: She is alert. ASSESSMENT/PLAN:      Diagnosis Orders   1.  Pneumonia of right lower lobe due to group B Streptococcus (HCC)  XR CHEST (2 VW)   2. Acute vaginitis  fluconazole (DIFLUCAN) 150 MG tablet   3. At high risk for falls     4. Rheumatoid arthritis involving multiple sites with positive rheumatoid factor ([facility]     5. History of Sjogren's disease ([facility]           Dx Pneumonia get FU CXR order given  ? Yeast vaginitis Rx Diflucan   Dx RA Specialist Dr Obed Griggs next week for f/u  History Sjogren  Followed by Rheumatology Dr Obed Griggs   On the basis of positive falls risk screening, assessment and plan is as follows: advice use Cane or walker Put light at night time for bathroom

## 2022-05-05 DIAGNOSIS — R60.9 PERIPHERAL EDEMA: ICD-10-CM

## 2022-05-05 NOTE — TELEPHONE ENCOUNTER
Medication:   Requested Prescriptions     Pending Prescriptions Disp Refills    furosemide (LASIX) 40 MG tablet [Pharmacy Med Name: FUROSEMIDE TABS 40MG] 90 tablet 3     Sig: TAKE 1 TABLET ONCE DAILY     Last Filled: 5/10/21  Last appt: 4/11/2022   Next appt: Visit date not found    Last OARRS:   RX Monitoring 6/29/2020   Periodic Controlled Substance Monitoring No signs of potential drug abuse or diversion identified.

## 2022-05-06 RX ORDER — FUROSEMIDE 40 MG/1
TABLET ORAL
Qty: 90 TABLET | Refills: 1 | Status: SHIPPED | OUTPATIENT
Start: 2022-05-06 | End: 2022-11-02

## 2022-05-11 ENCOUNTER — TELEPHONE (OUTPATIENT)
Dept: PRIMARY CARE CLINIC | Age: 72
End: 2022-05-11
Payer: MEDICARE

## 2022-05-11 DIAGNOSIS — J18.9 PNEUMONIA DUE TO INFECTIOUS ORGANISM, UNSPECIFIED LATERALITY, UNSPECIFIED PART OF LUNG: Primary | ICD-10-CM

## 2022-05-11 PROCEDURE — G0180 MD CERTIFICATION HHA PATIENT: HCPCS | Performed by: FAMILY MEDICINE

## 2022-05-27 DIAGNOSIS — M54.50 CHRONIC LOW BACK PAIN WITHOUT SCIATICA, UNSPECIFIED BACK PAIN LATERALITY: ICD-10-CM

## 2022-05-27 DIAGNOSIS — G89.29 CHRONIC LOW BACK PAIN WITHOUT SCIATICA, UNSPECIFIED BACK PAIN LATERALITY: ICD-10-CM

## 2022-05-27 RX ORDER — AMITRIPTYLINE HYDROCHLORIDE 25 MG/1
TABLET, FILM COATED ORAL
Qty: 90 TABLET | Refills: 3 | Status: SHIPPED | OUTPATIENT
Start: 2022-05-27

## 2022-05-27 NOTE — TELEPHONE ENCOUNTER
Medication:   Requested Prescriptions     Pending Prescriptions Disp Refills    amitriptyline (ELAVIL) 25 mg tablet [Pharmacy Med Name: AMITRIPTYLINE HCL TABS 25MG] 90 tablet 3     Sig: TAKE 1 TABLET NIGHTLY     Last Filled:  6/1/21    Last appt: 4/11/2022   Next appt: Visit date not found    Last OARRS:   RX Monitoring 6/29/2020   Periodic Controlled Substance Monitoring No signs of potential drug abuse or diversion identified. Yes

## 2022-06-02 NOTE — TELEPHONE ENCOUNTER
Medication:   Requested Prescriptions     Pending Prescriptions Disp Refills    gabapentin (NEURONTIN) 300 MG capsule 180 capsule 2     Sig: TAKE 1 CAPSULE BY MOUTH TWO TIMES A DAY        Last Filled:      Patient Phone Number: [phone] (home)     Last appt: 4/11/2022   Next appt: Visit date not found    Last OARRS:   RX Monitoring 6/29/2020   Periodic Controlled Substance Monitoring No signs of potential drug abuse or diversion identified.        Preferred Pharmacy:   [facility] [address] [facility] [phone] - f [phone]  [address] [place] [address]  Phone: [phone] Fax: [phone] [address] [facility] Rom[address] [facility] [phone] - f [phone]  [facility] [address]  [address] [place], [place] [id number]  Phone: [phone] Fax: [phone]    [facility] [address] - [place], [address] [facility] [address]  [address]  Phone: [phone] Fax: [phone] 7280 [facility], [address] [place] [address] [phone] - F [phone]  [facility] [address]  Phone: [phone] Fax: 180 [facility], [address] [facility] [phone] - f [phone]  [facility] [address]  Phone: [phone] Fax: [phone]

## 2022-06-02 NOTE — TELEPHONE ENCOUNTER
Medication:   Requested Prescriptions     Pending Prescriptions Disp Refills    gabapentin (NEURONTIN) 300 MG capsule 180 capsule 2     Sig: TAKE 1 CAPSULE BY MOUTH TWO TIMES A DAY        Last Filled:      Patient Phone Number: [phone] (home)     Last appt: 4/11/2022   Next appt: Visit date not found    Last OARRS:   RX Monitoring 6/29/2020   Periodic Controlled Substance Monitoring No signs of potential drug abuse or diversion identified.        Preferred Pharmacy:   [address] [facility] [phone] - f [phone]  [address] [place] [address]  Phone: [phone] Fax: [phone] 252 [facility] Romana Whitney [facility] [phone] - f [phone]  [address] [place] [address]  Phone: [phone] Fax: [phone]    [facility] - [place], [address] [place] [address] [address]  [address]  Phone: [phone] Fax: [phone] 2404 [facility] Jama, [address] [place] [address] [phone] - F [phone]  [facility] [address]  Phone: [phone] Fax: [phone] [facility], [address] [phone] - f [phone]  [facility] [address]  Phone: [phone] Fax: [phone]

## 2022-06-03 RX ORDER — GABAPENTIN 300 MG/1
CAPSULE ORAL
Qty: 180 CAPSULE | Refills: 2 | Status: SHIPPED | OUTPATIENT
Start: 2022-06-03 | End: 2022-12-03

## 2022-06-23 ENCOUNTER — TELEPHONE (OUTPATIENT)
Dept: PRIMARY CARE CLINIC | Age: 72
End: 2022-06-23

## 2022-07-04 ENCOUNTER — PATIENT MESSAGE (OUTPATIENT)
Dept: PRIMARY CARE CLINIC | Age: 72
End: 2022-07-04

## 2022-07-11 ENCOUNTER — OFFICE VISIT (OUTPATIENT)
Dept: PRIMARY CARE CLINIC | Age: 72
End: 2022-07-11
Payer: MEDICARE

## 2022-07-11 VITALS
SYSTOLIC BLOOD PRESSURE: 118 MMHG | HEIGHT: 60 IN | BODY MASS INDEX: 29.45 KG/M2 | WEIGHT: 150 LBS | HEART RATE: 84 BPM | TEMPERATURE: 97.8 F | OXYGEN SATURATION: 97 % | DIASTOLIC BLOOD PRESSURE: 76 MMHG

## 2022-07-11 DIAGNOSIS — Z00.00 ENCOUNTER FOR SUBSEQUENT ANNUAL WELLNESS VISIT (AWV) IN MEDICARE PATIENT: Primary | ICD-10-CM

## 2022-07-11 DIAGNOSIS — L03.119 RECURRENT CELLULITIS OF LOWER EXTREMITY: ICD-10-CM

## 2022-07-11 DIAGNOSIS — Z87.11 HISTORY OF BLEEDING PEPTIC ULCER: ICD-10-CM

## 2022-07-11 DIAGNOSIS — M48.061 SPINAL STENOSIS OF LUMBAR REGION, UNSPECIFIED WHETHER NEUROGENIC CLAUDICATION PRESENT: ICD-10-CM

## 2022-07-11 DIAGNOSIS — M05.79 RHEUMATOID ARTHRITIS INVOLVING MULTIPLE SITES WITH POSITIVE RHEUMATOID FACTOR (HCC): ICD-10-CM

## 2022-07-11 PROCEDURE — 1123F ACP DISCUSS/DSCN MKR DOCD: CPT | Performed by: FAMILY MEDICINE

## 2022-07-11 PROCEDURE — G0439 PPPS, SUBSEQ VISIT: HCPCS | Performed by: FAMILY MEDICINE

## 2022-07-11 ASSESSMENT — PATIENT HEALTH QUESTIONNAIRE - PHQ9
SUM OF ALL RESPONSES TO PHQ QUESTIONS 1-9: 0
SUM OF ALL RESPONSES TO PHQ QUESTIONS 1-9: 0
SUM OF ALL RESPONSES TO PHQ9 QUESTIONS 1 & 2: 0
SUM OF ALL RESPONSES TO PHQ QUESTIONS 1-9: 0
2. FEELING DOWN, DEPRESSED OR HOPELESS: 0
SUM OF ALL RESPONSES TO PHQ QUESTIONS 1-9: 0
2. FEELING DOWN, DEPRESSED OR HOPELESS: 0
1. LITTLE INTEREST OR PLEASURE IN DOING THINGS: 0
SUM OF ALL RESPONSES TO PHQ QUESTIONS 1-9: 0
1. LITTLE INTEREST OR PLEASURE IN DOING THINGS: 0
SUM OF ALL RESPONSES TO PHQ QUESTIONS 1-9: 0
SUM OF ALL RESPONSES TO PHQ9 QUESTIONS 1 & 2: 0
SUM OF ALL RESPONSES TO PHQ QUESTIONS 1-9: 0
SUM OF ALL RESPONSES TO PHQ QUESTIONS 1-9: 0

## 2022-07-11 ASSESSMENT — ENCOUNTER SYMPTOMS
ABDOMINAL PAIN: 0
RESPIRATORY NEGATIVE: 1
DIARRHEA: 0
CONSTIPATION: 0
EYES NEGATIVE: 1
BACK PAIN: 1

## 2022-07-11 ASSESSMENT — LIFESTYLE VARIABLES: HOW OFTEN DO YOU HAVE A DRINK CONTAINING ALCOHOL: NEVER

## 2022-07-11 NOTE — PROGRESS NOTES
SUBJECTIVE:  Patient ID: Alphonso Tomas is a 80 y.o. female.   Chief Complaint:  Chief Complaint   Patient presents with    Medicare AWV       HPI   80year old Female  AWV    Past Medical History:   Diagnosis Date    Breast cancer ([facility] [address] 2008    RT     H/O gastroesophageal reflux (GERD)     Menopause ovarian failure      Past Surgical History:   Procedure Laterality Date    BREAST BIOPSY      BREAST LUMPECTOMY      BREAST SURGERY  2008    Right     CARPAL TUNNEL RELEASE  08/28/2012    Left    CARPAL TUNNEL RELEASE  09/20/2012    Right    COLONOSCOPY N/A 12/28/2020    COLONOSCOPY WITH BIOPSY performed by Parker De Jesus MD at [address] [place] [address]  10/07/2021    PUD  done @    UPPER GASTROINTESTINAL ENDOSCOPY N/A 12/28/2020    EGD DILATION BALLOON performed by Parker De Jesus MD at [facility] ENDOSCOPY     Allergies   Allergen Reactions    Clindamycin/Lincomycin      C-Diff    Neosporin [Neomycin-Polymyx-Gramicid]     Parabens     Polysporin [Bacitracin-Polymyxin B]     Sulfa Antibiotics Hives       Family History   Problem Relation Age of Onset    Other Mother         RA alive 80year old assist living     Social History     Social History Narrative        No tobacco    No alcohol    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       Patient Active Problem List   Diagnosis    Incontinence of urine    Chronic back pain    Spinal stenosis    GERD (gastroesophageal reflux disease)    History of breast cancer    Peripheral edema    Chronic venous insufficiency    Pyloric stenosis    Rheumatoid arthritis involving multiple sites with positive rheumatoid factor (HCC)    H/O Sjogren's disease ([facility] [address]    History of Clostridioides difficile infection    Sepsis due to group A Streptococcus (HCC)    Cellulitis and abscess of right leg    Status post incision and drainage     Current Outpatient Medications   Medication Sig Dispense Refill    gabapentin (NEURONTIN) 300 MG capsule TAKE 1 BUN 13 11/14/2021 0000    BUN 9 11/14/2021 0000    BUN 13 11/14/2021 0000    CREATININE 0.45 11/14/2021 0000    CREATININE 0.24 11/14/2021 0000    CREATININE 0.45 11/14/2021 0000    CREATININE 0.8 06/24/2020 0000        Component Value Date/Time    CALCIUM 8.9 11/14/2021 0000    CALCIUM 7.5 11/14/2021 0000    CALCIUM 8.9 11/14/2021 0000    ALKPHOS 99 11/14/2021 0000    AST 32 11/14/2021 0000    ALT 34 11/14/2021 0000    BILITOT 0.5 11/14/2021 0000        Lab Results   Component Value Date    TSH 1.82 06/10/2019     Lab Results   Component Value Date    LABA1C 5.0 07/22/2020    LABA1C 5.0 07/22/2020     Lab Results   Component Value Date    .8 06/10/2019         Review of Systems   Constitutional: Negative. Over all better   HENT: Negative. Eyes: Negative. Eye check up   Respiratory: Negative. Cardiovascular: Negative. Gastrointestinal: Negative for abdominal pain, constipation and diarrhea. S/P PUD surgery Oct 2021   Endocrine: Negative. Genitourinary: Negative for dysuria. Musculoskeletal: Positive for back pain and neck pain. Rheumatology Care  Dx RA  Infusion  Hands arthritis  Back issue  Neck & Both shoulder pain   Skin:        NO dermatology  Wound Care weekly visit Rt leg   Multiple & Numerous infection Rt Lower leg  Venous stasis changes    Allergic/Immunologic: Positive for environmental allergies. Neurological: Negative for dizziness and headaches. Psychiatric/Behavioral: Negative. Negative for behavioral problems, self-injury, sleep disturbance and suicidal ideas. The patient is not nervous/anxious and is not hyperactive. Keep busy   G children       OBJECTIVE:  Ht 5' (1.524 m)   Wt 150 lb (68 kg)   LMP  (LMP Unknown)   BMI 29.29 kg/m²   Physical Exam  HENT:      Head: Normocephalic. Eyes:      Extraocular Movements: Extraocular movements intact. Pupils: Pupils are equal, round, and reactive to light.    Cardiovascular:      Rate

## 2022-07-11 NOTE — TELEPHONE ENCOUNTER
From: Baljit Plan  To: Dr. Yair Morillo: 7/4/2022 9:29 PM EDT  Subject: yearly wellness visit questionnaires    Dr. Kyle Dave,   I got this reminder and wanted to get those questionnaires completed well before my appointment with you, but when I clicked on the link, it did not go anywhere. It only pulled up the same/identical message in a new tab. Is there a way I can get the questionnaires emailed directly to me? My email address is [email or web address] Thanks in advance. Diya Solis    This is a reminder for your upcoming appointment. Location of Appointment: [address]  Provider: Alfredo Maria MD  Date: 7/11/22  Time: 1:30 PM     Please complete digital registration via the [facility] qian. If unable to complete the visit pre-check, arrive 15 minutes early. Bring photo ID, insurance card(s), co-pay, medication bottles & completed forms. Certain insurance plans have specific eligibility requirements related to preventative wellness visits. Please confirm with your plan or policy to ensure your visit will be covered. If you need to cancel/reschedule, please contact the practice at least 24 hours prior to the appointment. There are 2 questionnaires available for your appointment.   View your available questionnaires

## 2022-11-01 DIAGNOSIS — R60.9 PERIPHERAL EDEMA: ICD-10-CM

## 2022-11-01 NOTE — TELEPHONE ENCOUNTER
Medication:   Requested Prescriptions     Pending Prescriptions Disp Refills    furosemide (LASIX) 40 MG tablet [Pharmacy Med Name: FUROSEMIDE TABS 40MG] 90 tablet 3     Sig: TAKE 1 TABLET ONCE DAILY     Last Filled:  5.6.22    Last appt: 7/11/2022   Next appt: Visit date not found    Last OARRS:   RX Monitoring 6/29/2020   Periodic Controlled Substance Monitoring No signs of potential drug abuse or diversion identified.

## 2022-11-02 RX ORDER — FUROSEMIDE 40 MG/1
TABLET ORAL
Qty: 90 TABLET | Refills: 3 | Status: SHIPPED | OUTPATIENT
Start: 2022-11-02

## 2023-01-27 ENCOUNTER — OFFICE VISIT (OUTPATIENT)
Dept: PRIMARY CARE CLINIC | Age: 73
End: 2023-01-27

## 2023-01-27 VITALS
HEART RATE: 99 BPM | HEIGHT: 62 IN | DIASTOLIC BLOOD PRESSURE: 76 MMHG | WEIGHT: 170.6 LBS | OXYGEN SATURATION: 97 % | TEMPERATURE: 97.8 F | SYSTOLIC BLOOD PRESSURE: 108 MMHG | BODY MASS INDEX: 31.39 KG/M2

## 2023-01-27 DIAGNOSIS — M05.79 RHEUMATOID ARTHRITIS INVOLVING MULTIPLE SITES WITH POSITIVE RHEUMATOID FACTOR (HCC): ICD-10-CM

## 2023-01-27 DIAGNOSIS — L02.92 BOIL: Primary | ICD-10-CM

## 2023-01-27 RX ORDER — CEPHALEXIN 500 MG/1
500 CAPSULE ORAL 4 TIMES DAILY
Qty: 40 CAPSULE | Refills: 0 | Status: SHIPPED | OUTPATIENT
Start: 2023-01-27

## 2023-01-27 ASSESSMENT — PATIENT HEALTH QUESTIONNAIRE - PHQ9
SUM OF ALL RESPONSES TO PHQ QUESTIONS 1-9: 0
SUM OF ALL RESPONSES TO PHQ QUESTIONS 1-9: 0
2. FEELING DOWN, DEPRESSED OR HOPELESS: 0
SUM OF ALL RESPONSES TO PHQ9 QUESTIONS 1 & 2: 0
SUM OF ALL RESPONSES TO PHQ QUESTIONS 1-9: 0
SUM OF ALL RESPONSES TO PHQ QUESTIONS 1-9: 0
1. LITTLE INTEREST OR PLEASURE IN DOING THINGS: 0

## 2023-01-27 ASSESSMENT — ENCOUNTER SYMPTOMS
APNEA: 0
WHEEZING: 0
SHORTNESS OF BREATH: 0
RESPIRATORY NEGATIVE: 1
ALLERGIC/IMMUNOLOGIC NEGATIVE: 1

## 2023-01-27 NOTE — PROGRESS NOTES
SUBJECTIVE:  Patient ID: Yaneli Moore is a 71 y.o. female.   Chief Complaint:  Chief Complaint   Patient presents with    Mass     Boil under Left arm x 1 week        HPI  71year old female  [de-identified] Left Axilla x one week  No fever /no chills  Put local Neosporin   Hot compressor   Dx RA  infusion Ornecia  every 30 days next 2/15/2023  Hx MERSA in past   Hx Necrotizing Fascitis lower leg she was followed by Wound Care    Past Medical History:   Diagnosis Date    Breast cancer ([facility] 2008    RT     H/O gastroesophageal reflux (GERD)     Menopause ovarian failure      Past Surgical History:   Procedure Laterality Date    BREAST BIOPSY      BREAST LUMPECTOMY      BREAST SURGERY  2008    Right     CARPAL TUNNEL RELEASE  08/28/2012    Left    CARPAL TUNNEL RELEASE  09/20/2012    Right    COLONOSCOPY N/A 12/28/2020    COLONOSCOPY WITH BIOPSY performed by Nighat Raymond MD at [address]  10/07/2021    PUD  done @    UPPER GASTROINTESTINAL ENDOSCOPY N/A 12/28/2020    EGD DILATION BALLOON performed by Nighat Raymond MD at [address]   Allergen Reactions    Clindamycin/Lincomycin      C-Diff    Sulfa Antibiotics Hives    Polysporin [Bacitracin-Polymyxin B]     Parabens      Lip [place]   Only use Birtt bee     Family History   Problem Relation Age of Onset    Other Mother         RA alive 80year old assist living     Social History     Social History Narrative        No tobacco    No alcohol    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         Patient Active Problem List   Diagnosis    Incontinence of urine    Chronic back pain    Spinal stenosis    GERD (gastroesophageal reflux disease)    History of breast cancer    Peripheral edema    Chronic venous insufficiency    Pyloric stenosis    Rheumatoid arthritis involving multiple sites with positive rheumatoid factor (HCC)    H/O Sjogren's disease ([facility] [address]    History of Clostridioides difficile infection    Sepsis due to group A Streptococcus (HCC)    Cellulitis and abscess of right leg    Status post incision and drainage     Current Outpatient Medications   Medication Sig Dispense Refill    furosemide (LASIX) 40 MG tablet TAKE 1 TABLET ONCE DAILY 90 tablet 3    amitriptyline (ELAVIL) 25 mg tablet TAKE 1 TABLET NIGHTLY 90 tablet 3    hydroxychloroquine (PLAQUENIL) 200 MG tablet       famotidine (PEPCID) 10 MG tablet Take 10 mg by mouth 2 times daily       GINGER, ZINGIBER OFFICINALIS, PO Take 1 tablet by mouth daily      gabapentin (NEURONTIN) 300 MG capsule TAKE 1 CAPSULE BY MOUTH TWO TIMES A  capsule 2    desonide (DESOWEN) 0.05 % ointment Apply topically 2 times daily Apply topically 2 times daily as needed. (Patient not taking: No sig reported) 2 Tube 3     No current facility-administered medications for this visit.      Lab Results   Component Value Date    WBC 6.0 11/14/2021    WBC 6.0 11/14/2021    HGB 8.8 (A) 11/14/2021    HGB 8.8 (A) 11/14/2021    HCT 26.3 (A) 11/14/2021    HCT 26.3 (A) 11/14/2021    MCV 99.4 11/14/2021    MCV 99.4 11/14/2021     11/14/2021     11/14/2021     Lab Results   Component Value Date    CHOL 137 06/10/2019    CHOL 182 02/23/2018    CHOL 196 05/11/2016     Lab Results   Component Value Date    TRIG 97 11/08/2021    TRIG 56 06/10/2019    TRIG 55 02/23/2018     Lab Results   Component Value Date    HDL 41 06/10/2019    HDL 56 02/23/2018    HDL 56 05/11/2016     Lab Results   Component Value Date    LDLCALC 85 06/10/2019    LDLCALC 115 (H) 02/23/2018    LDLCALC 128 (H) 05/11/2016     Lab Results   Component Value Date    LABVLDL 11 06/10/2019    LABVLDL 11 02/23/2018    LABVLDL 12 05/11/2016     Lab Results   Component Value Date    CHOLHDLRATIO 4.2 08/02/2012       Chemistry        Component Value Date/Time     11/14/2021 0000     11/14/2021 0000     11/14/2021 0000    K 4.1 11/14/2021 0000    K 3.6 11/14/2021 0000    K 4.1 11/14/2021 0000     11/14/2021 0000  11/14/2021 0000    CL 10 11/14/2021 0000    CO2 26 11/14/2021 0000    CO2 23 11/14/2021 0000    CO2 26 11/14/2021 0000    BUN 13 11/14/2021 0000    BUN 9 11/14/2021 0000    BUN 13 11/14/2021 0000    CREATININE 0.45 11/14/2021 0000    CREATININE 0.24 11/14/2021 0000    CREATININE 0.45 11/14/2021 0000    CREATININE 0.8 06/24/2020 0000        Component Value Date/Time    CALCIUM 8.9 11/14/2021 0000    CALCIUM 7.5 11/14/2021 0000    CALCIUM 8.9 11/14/2021 0000    ALKPHOS 99 11/14/2021 0000    AST 32 11/14/2021 0000    ALT 34 11/14/2021 0000    BILITOT 0.5 11/14/2021 0000            Review of Systems   Constitutional:  Negative for chills, diaphoresis and fever. HENT: Negative. Eyes:         Waiting for Cataract Rt first  Dr Elizabeth Pagan  Dry eye  Blepheritis   Respiratory: Negative. Negative for apnea, shortness of breath and wheezing. Cardiovascular: Negative. Negative for chest pain and palpitations. Gastrointestinal:         Pepcid    Endocrine: Negative. Genitourinary:  Negative for dysuria and flank pain. Mammogram is due    Musculoskeletal:  Negative for gait problem. Rheumatoid  Arthritis [facility]  Infusion Ornecia  + Plaquinil  Dx osteoporosis    Allergic/Immunologic: Negative. Neurological:  Negative for dizziness and headaches. Psychiatric/Behavioral:  Negative for behavioral problems, confusion, self-injury and sleep disturbance. The patient is not nervous/anxious. Nap in day few Hours  Pt likes watch TV & use PC     OBJECTIVE:  /76 (Site: Right Upper Arm, Position: Sitting, Cuff Size: Medium Adult)   Pulse 99   Temp 97.8 °F (36.6 °C) (Oral)   Ht 5' 2.25\" (1.581 m)   Wt 170 lb 9.6 oz (77.4 kg)   LMP  (LMP Unknown)   SpO2 97%   BMI 30.95 kg/m²   Physical Exam  Constitutional:       Comments: BMI 30   HENT:      Head: Normocephalic. Eyes:      Extraocular Movements: Extraocular movements intact.       Pupils: Pupils are equal, round, and reactive to light. Cardiovascular:      Rate and Rhythm: Normal rate and regular rhythm. Pulses: Normal pulses. Heart sounds: Normal heart sounds. Pulmonary:      Effort: Pulmonary effort is normal.      Breath sounds: Normal breath sounds. No wheezing or rhonchi. Musculoskeletal:      Cervical back: Normal range of motion and neck supple. Skin:     Comments: Left Axilla + Erythematous lesion + tiny pus in middle    Neurological:      General: No focal deficit present. Mental Status: She is alert and oriented to person, place, and time. ASSESSMENT/PLAN:      Diagnosis Orders   1. Boil  cephALEXin (KEFLEX) 500 MG capsule      2.  Rheumatoid arthritis involving multiple sites with positive rheumatoid factor ([facility] [address]          Lab [facility] done through [facility]  last lab 1/18/2023      Dx RA Rheumatology Dr John Miguel  @[facility] monthly Lupe Gonzales left get visit with wound care  Pt has phone #

## 2023-03-20 RX ORDER — GABAPENTIN 300 MG/1
CAPSULE ORAL
Qty: 180 CAPSULE | Refills: 0 | Status: SHIPPED | OUTPATIENT
Start: 2023-03-20 | End: 2023-06-20

## 2023-03-20 NOTE — TELEPHONE ENCOUNTER
Medication:   Requested Prescriptions     Pending Prescriptions Disp Refills    gabapentin (NEURONTIN) 300 MG capsule [Pharmacy Med Name: Gabapentin Oral Capsule 300 MG] 180 capsule 0     Sig: TAKE 1 CAPSULE BY MOUTH TWO TIMES A DAY        Last Filled:  6/3/23      Patient Phone Number: [phone] (home)     Last appt: 1/27/2023   Next appt: Visit date not found    Last OARRS:   RX Monitoring 6/29/2020   Periodic Controlled Substance Monitoring No signs of potential drug abuse or diversion identified.

## 2023-05-16 NOTE — TELEPHONE ENCOUNTER
Enter Query Response Below      Query Response: >>No             If applicable, please update the problem list.       Patient: Wilbert Kelley        : 1960  Account: [id number]           Admit Date: 5/10/2023        How to Respond to this query:       a. Click New Note     b. Answer query within the yellow box.                c. Update the Problem List, if applicable.      If you have any questions about this query contact me at: [email or web address]     ,     63 y/o noted with Acute on Chronic Hypoxic Respiratory Failure, Pneumonia, Heart Failure with preserved EF Exacerbation, Atrial Fibrillation and Coronary Artery Disease. On admit, respiratory rate 18 and SpO2 98% on room air. On 05/10, ABG with PH 7.52, pCO2 38.3 and pO2 64.7 on room air. Vitals record with SpO2 94-99% on 2L. Patient treated with monitoring and intermittent oxygen at 2L by nasal cannula.     After study, is the Acute on Chronic Hypoxic Respiratory Failure supported:     >>No  >>Yes, please provide additional supporting clinical indicators:______________  >>Other (please specify):___________  >>Unable to determine     By submitting this query, we are merely seeking further clarification of documentation to accurately reflect all conditions that you are monitoring, evaluating, treating or that extend the hospitalization or utilize additional resources of care. Please utilize your independent clinical judgment when addressing the question(s) above.     This query and your response, once completed, will be entered into the legal medical record.    Sincerely,  Brittnee Brumfield RN CCDS   Clinical Documentation Integrity Program      OK verbal order for skilled nurse visit

## 2023-05-22 DIAGNOSIS — M54.50 CHRONIC LOW BACK PAIN WITHOUT SCIATICA, UNSPECIFIED BACK PAIN LATERALITY: ICD-10-CM

## 2023-05-22 DIAGNOSIS — G89.29 CHRONIC LOW BACK PAIN WITHOUT SCIATICA, UNSPECIFIED BACK PAIN LATERALITY: ICD-10-CM

## 2023-05-22 RX ORDER — AMITRIPTYLINE HYDROCHLORIDE 25 MG/1
TABLET, FILM COATED ORAL
Qty: 90 TABLET | Refills: 1 | Status: SHIPPED | OUTPATIENT
Start: 2023-05-22

## 2023-06-15 RX ORDER — GABAPENTIN 300 MG/1
CAPSULE ORAL
Qty: 180 CAPSULE | Refills: 0 | OUTPATIENT
Start: 2023-06-15

## 2023-06-15 NOTE — TELEPHONE ENCOUNTER
Medication:   Requested Prescriptions     Pending Prescriptions Disp Refills    gabapentin (NEURONTIN) 300 MG capsule [Pharmacy Med Name: Gabapentin Oral Capsule 300 MG] 180 capsule 0     Sig: TAKE 1 CAPSULE BY MOUTH 2 TIMES A DAY     Last Filled:  3.20.23    Last appt: 1/27/2023   Next appt: Visit date not found    Last OARRS:   RX Monitoring 6/29/2020   Periodic Controlled Substance Monitoring No signs of potential drug abuse or diversion identified.

## 2023-06-19 DIAGNOSIS — G89.29 CHRONIC LOW BACK PAIN WITHOUT SCIATICA, UNSPECIFIED BACK PAIN LATERALITY: Primary | ICD-10-CM

## 2023-06-19 DIAGNOSIS — M54.50 CHRONIC LOW BACK PAIN WITHOUT SCIATICA, UNSPECIFIED BACK PAIN LATERALITY: Primary | ICD-10-CM

## 2023-06-19 RX ORDER — GABAPENTIN 300 MG/1
CAPSULE ORAL
Qty: 180 CAPSULE | Refills: 0 | OUTPATIENT
Start: 2023-06-19

## 2023-06-19 RX ORDER — GABAPENTIN 300 MG/1
CAPSULE ORAL
Qty: 180 CAPSULE | Refills: 2 | Status: SHIPPED | OUTPATIENT
Start: 2023-06-19 | End: 2023-12-19

## 2023-06-19 NOTE — TELEPHONE ENCOUNTER
Medication:   Requested Prescriptions     Pending Prescriptions Disp Refills    gabapentin (NEURONTIN) 300 MG capsule [Pharmacy Med Name: Gabapentin Oral Capsule 300 MG] 180 capsule 0     Sig: TAKE 1 CAPSULE BY MOUTH 2 TIMES A DAY     Last Filled:  3.20.23    Last appt: 1/27/2023   Next appt: 6/30/2023    Last OARRS:   RX Monitoring 6/29/2020   Periodic Controlled Substance Monitoring No signs of potential drug abuse or diversion identified.

## 2023-06-30 ENCOUNTER — TELEPHONE (OUTPATIENT)
Dept: PRIMARY CARE CLINIC | Age: 73
End: 2023-06-30

## 2023-06-30 ENCOUNTER — OFFICE VISIT (OUTPATIENT)
Dept: PRIMARY CARE CLINIC | Age: 73
End: 2023-06-30

## 2023-06-30 VITALS
HEART RATE: 71 BPM | DIASTOLIC BLOOD PRESSURE: 80 MMHG | BODY MASS INDEX: 30.63 KG/M2 | TEMPERATURE: 98 F | SYSTOLIC BLOOD PRESSURE: 120 MMHG | OXYGEN SATURATION: 97 % | WEIGHT: 168.8 LBS

## 2023-06-30 DIAGNOSIS — H25.013 CORTICAL AGE-RELATED CATARACT OF BOTH EYES: Primary | ICD-10-CM

## 2023-06-30 DIAGNOSIS — M05.79 RHEUMATOID ARTHRITIS INVOLVING MULTIPLE SITES WITH POSITIVE RHEUMATOID FACTOR (HCC): ICD-10-CM

## 2023-06-30 DIAGNOSIS — Z01.818 PREOP EXAMINATION: ICD-10-CM

## 2023-06-30 PROBLEM — Z98.890 STATUS POST INCISION AND DRAINAGE: Status: RESOLVED | Noted: 2021-11-21 | Resolved: 2023-06-30

## 2023-06-30 PROBLEM — L02.415 CELLULITIS AND ABSCESS OF RIGHT LEG: Status: RESOLVED | Noted: 2021-11-21 | Resolved: 2023-06-30

## 2023-06-30 PROBLEM — L03.115 CELLULITIS AND ABSCESS OF RIGHT LEG: Status: RESOLVED | Noted: 2021-11-21 | Resolved: 2023-06-30

## 2023-06-30 RX ORDER — UPADACITINIB 15 MG/1
15 TABLET, EXTENDED RELEASE ORAL
COMMUNITY

## 2023-06-30 SDOH — ECONOMIC STABILITY: INCOME INSECURITY: HOW HARD IS IT FOR YOU TO PAY FOR THE VERY BASICS LIKE FOOD, HOUSING, MEDICAL CARE, AND HEATING?: NOT HARD AT ALL

## 2023-06-30 SDOH — ECONOMIC STABILITY: FOOD INSECURITY: WITHIN THE PAST 12 MONTHS, YOU WORRIED THAT YOUR FOOD WOULD RUN OUT BEFORE YOU GOT MONEY TO BUY MORE.: NEVER TRUE

## 2023-06-30 SDOH — ECONOMIC STABILITY: FOOD INSECURITY: WITHIN THE PAST 12 MONTHS, THE FOOD YOU BOUGHT JUST DIDN'T LAST AND YOU DIDN'T HAVE MONEY TO GET MORE.: NEVER TRUE

## 2023-06-30 ASSESSMENT — ENCOUNTER SYMPTOMS
ALLERGIC/IMMUNOLOGIC COMMENTS: ZYRTEC
ROS SKIN COMMENTS: NO DERMATOLOGY
RESPIRATORY NEGATIVE: 1
WHEEZING: 0
APNEA: 0
SHORTNESS OF BREATH: 0

## 2023-09-19 ENCOUNTER — TELEPHONE (OUTPATIENT)
Dept: PRIMARY CARE CLINIC | Age: 73
End: 2023-09-19

## 2023-09-25 ENCOUNTER — OFFICE VISIT (OUTPATIENT)
Dept: PRIMARY CARE CLINIC | Age: 73
End: 2023-09-25

## 2023-09-25 VITALS
OXYGEN SATURATION: 99 % | SYSTOLIC BLOOD PRESSURE: 112 MMHG | DIASTOLIC BLOOD PRESSURE: 72 MMHG | BODY MASS INDEX: 30.73 KG/M2 | TEMPERATURE: 97.2 F | WEIGHT: 167 LBS | HEIGHT: 62 IN | HEART RATE: 98 BPM

## 2023-09-25 DIAGNOSIS — M05.79 RHEUMATOID ARTHRITIS INVOLVING MULTIPLE SITES WITH POSITIVE RHEUMATOID FACTOR (HCC): ICD-10-CM

## 2023-09-25 DIAGNOSIS — M48.061 SPINAL STENOSIS OF LUMBAR REGION, UNSPECIFIED WHETHER NEUROGENIC CLAUDICATION PRESENT: ICD-10-CM

## 2023-09-25 DIAGNOSIS — Z51.89 ENCOUNTER FOR WOUND CARE: ICD-10-CM

## 2023-09-25 DIAGNOSIS — M35.00 H/O SJOGREN'S DISEASE (HCC): ICD-10-CM

## 2023-09-25 DIAGNOSIS — I87.8 CHRONIC VENOUS STASIS: ICD-10-CM

## 2023-09-25 DIAGNOSIS — Z00.00 ENCOUNTER FOR SUBSEQUENT ANNUAL WELLNESS VISIT (AWV) IN MEDICARE PATIENT: Primary | ICD-10-CM

## 2023-09-25 DIAGNOSIS — G89.4 CHRONIC PAIN SYNDROME: ICD-10-CM

## 2023-09-25 PROBLEM — A40.0 SEPSIS DUE TO GROUP A STREPTOCOCCUS (HCC): Status: RESOLVED | Noted: 2021-11-21 | Resolved: 2023-09-25

## 2023-09-25 RX ORDER — CEPHALEXIN 500 MG/1
500 CAPSULE ORAL 4 TIMES DAILY
Qty: 40 CAPSULE | Refills: 0 | Status: SHIPPED | OUTPATIENT
Start: 2023-09-25

## 2023-09-25 ASSESSMENT — PATIENT HEALTH QUESTIONNAIRE - PHQ9
SUM OF ALL RESPONSES TO PHQ QUESTIONS 1-9: 0
2. FEELING DOWN, DEPRESSED OR HOPELESS: 0
SUM OF ALL RESPONSES TO PHQ QUESTIONS 1-9: 0
1. LITTLE INTEREST OR PLEASURE IN DOING THINGS: 0
SUM OF ALL RESPONSES TO PHQ QUESTIONS 1-9: 0
SUM OF ALL RESPONSES TO PHQ9 QUESTIONS 1 & 2: 0
SUM OF ALL RESPONSES TO PHQ QUESTIONS 1-9: 0

## 2023-09-25 ASSESSMENT — ENCOUNTER SYMPTOMS
SORE THROAT: 0
APNEA: 0
SINUS PAIN: 0
SINUS PRESSURE: 0
ALLERGIC/IMMUNOLOGIC NEGATIVE: 1
WHEEZING: 0
SHORTNESS OF BREATH: 0
ABDOMINAL PAIN: 0

## 2023-09-25 ASSESSMENT — LIFESTYLE VARIABLES
HOW OFTEN DO YOU HAVE A DRINK CONTAINING ALCOHOL: NEVER
HOW MANY STANDARD DRINKS CONTAINING ALCOHOL DO YOU HAVE ON A TYPICAL DAY: PATIENT DOES NOT DRINK

## 2023-09-25 NOTE — PROGRESS NOTES
SUBJECTIVE:  Patient ID: Edgar Mendes is a 71 y.o. female. Chief Complaint:  Chief Complaint   Patient presents with    Medicare AWV       HPI  71year old Female  Rheumatology +RA Dr Akhil Young    Rx RinvoQ QD  No more infusion   Pain specialist 8/10/2023  Rt shoulder pain ,chronic hand & lumbar   Dr Dawson [facility] one visit  FU   MRI Lumbar is pending   Hx Epidural   Hx Dry eye   Patient Active Problem List   Diagnosis    Incontinence of urine    Chronic back pain    Spinal stenosis    GERD (gastroesophageal reflux disease)    History of breast cancer    Chronic venous insufficiency    Pyloric stenosis    Rheumatoid arthritis involving multiple sites with positive rheumatoid factor ([address])    H/O Sjogren's disease ([address])    History of Clostridioides difficile infection    Sepsis due to group A Streptococcus ([address])     Current Outpatient Medications   Medication Sig Dispense Refill    Upadacitinib ER (RINVOQ) 15 MG TB24 Take 15 tablets by mouth      gabapentin (NEURONTIN) 300 MG capsule TAKE 1 CAPSULE BY MOUTH TWO TIMES A  capsule 2    amitriptyline (ELAVIL) 25 MG tablet TAKE 1 TABLET NIGHTLY 90 tablet 1    furosemide (LASIX) 40 MG tablet TAKE 1 TABLET ONCE DAILY 90 tablet 3    hydroxychloroquine (PLAQUENIL) 200 MG tablet       famotidine (PEPCID) 10 MG tablet Take 1 tablet by mouth 2 times daily      ANDRAE, ZINGIBER OFFICINALIS, PO Take 1 tablet by mouth daily       No current facility-administered medications for this visit.      Lab Results   Component Value Date    WBC 7.1 08/08/2023    HGB 12.5 08/08/2023    HCT 37.4 08/08/2023    MCV 92.2 08/08/2023     08/08/2023     Lab Results   Component Value Date    CHOL 160 08/08/2023    CHOL 137 06/10/2019    CHOL 182 02/23/2018     Lab Results   Component Value Date    TRIG 60 08/08/2023    TRIG 97 11/08/2021    TRIG 56 06/10/2019     Lab Results   Component Value Date    HDL 50 08/08/2023    HDL 41 06/10/2019    HDL 56 02/23/2018     Lab Results   Component

## 2023-09-25 NOTE — PROGRESS NOTES
SUBJECTIVE:  Patient ID: Yuniel Morton is a 71 y.o. female.   Chief Complaint:  Chief Complaint   Patient presents with    Medicare AWV       HPI  71year old Female  AWV  Dx RA  Dx Recurrent lower leg wound  Dx Chronic back pain    Past Medical History:   Diagnosis Date    Breast cancer ([address] )     RT     H/O gastroesophageal reflux (GERD)     Menopause ovarian failure     RA (rheumatoid arthritis) [facility])     Rheumatology Dr Sabiha Marcano @[facility]     Past Surgical History:   Procedure Laterality Date    BREAST BIOPSY      BREAST LUMPECTOMY      BREAST SURGERY      Right     CARPAL TUNNEL RELEASE  2012    Left    CARPAL TUNNEL RELEASE  2012    Right    COLONOSCOPY N/A 2020    COLONOSCOPY WITH BIOPSY performed by Cathy Gutierrez MD at [address] [place] [address]  2021    wound care [facility]  10/07/2021    PUD  done @    UPPER GASTROINTESTINAL ENDOSCOPY N/A 2020    EGD DILATION BALLOON performed by Cathy Gutierrez MD at 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

## 2023-10-17 RX ORDER — CLOTRIMAZOLE AND BETAMETHASONE DIPROPIONATE 10; .64 MG/G; MG/G
CREAM TOPICAL
Qty: 30 G | Refills: 5 | Status: SHIPPED | OUTPATIENT
Start: 2023-10-17

## 2023-10-27 DIAGNOSIS — R60.9 PERIPHERAL EDEMA: ICD-10-CM

## 2023-10-27 RX ORDER — FUROSEMIDE 40 MG/1
TABLET ORAL
Qty: 90 TABLET | Refills: 1 | Status: SHIPPED | OUTPATIENT
Start: 2023-10-27

## 2023-10-27 NOTE — TELEPHONE ENCOUNTER
Medication:   Requested Prescriptions     Pending Prescriptions Disp Refills    furosemide (LASIX) 40 MG tablet [Pharmacy Med Name: FUROSEMIDE TABS 40MG] 90 tablet 3     Sig: TAKE 1 TABLET ONCE DAILY     Last filled: 11.2.22  Last appt: 9/25/2023   Next appt: Visit date not found    Last OARRS:       6/29/2020     5:11 PM   RX Monitoring   Periodic Controlled Substance Monitoring No signs of potential drug abuse or diversion identified.

## 2023-11-20 DIAGNOSIS — G89.29 CHRONIC LOW BACK PAIN WITHOUT SCIATICA, UNSPECIFIED BACK PAIN LATERALITY: ICD-10-CM

## 2023-11-20 DIAGNOSIS — M54.50 CHRONIC LOW BACK PAIN WITHOUT SCIATICA, UNSPECIFIED BACK PAIN LATERALITY: ICD-10-CM

## 2023-11-20 RX ORDER — AMITRIPTYLINE HYDROCHLORIDE 25 MG/1
TABLET, FILM COATED ORAL
Qty: 90 TABLET | Refills: 3 | Status: SHIPPED | OUTPATIENT
Start: 2023-11-20

## 2023-11-20 NOTE — TELEPHONE ENCOUNTER
Medication:   Requested Prescriptions     Pending Prescriptions Disp Refills    amitriptyline (ELAVIL) 25 MG tablet [Pharmacy Med Name: AMITRIPTYLINE HCL TABS 25MG] 90 tablet 3     Sig: TAKE 1 TABLET NIGHTLY     Last Filled:  5/22/2023    Last appt: 9/25/2023   Next appt: Visit date not found    Last OARRS:       6/29/2020     5:11 PM   RX Monitoring   Periodic Controlled Substance Monitoring No signs of potential drug abuse or diversion identified.

## 2024-01-08 ENCOUNTER — OFFICE VISIT (OUTPATIENT)
Dept: PRIMARY CARE CLINIC | Age: 74
End: 2024-01-08
Payer: MEDICARE

## 2024-01-08 VITALS
BODY MASS INDEX: 30.26 KG/M2 | WEIGHT: 166.8 LBS | TEMPERATURE: 97.2 F | DIASTOLIC BLOOD PRESSURE: 80 MMHG | OXYGEN SATURATION: 99 % | SYSTOLIC BLOOD PRESSURE: 114 MMHG | HEART RATE: 95 BPM

## 2024-01-08 DIAGNOSIS — M35.00 H/O SJOGREN'S DISEASE (HCC): ICD-10-CM

## 2024-01-08 DIAGNOSIS — Z00.00 ENCOUNTER FOR SUBSEQUENT ANNUAL WELLNESS VISIT (AWV) IN MEDICARE PATIENT: Primary | ICD-10-CM

## 2024-01-08 DIAGNOSIS — G89.4 CHRONIC PAIN SYNDROME: ICD-10-CM

## 2024-01-08 DIAGNOSIS — M54.50 CHRONIC LOW BACK PAIN WITHOUT SCIATICA, UNSPECIFIED BACK PAIN LATERALITY: ICD-10-CM

## 2024-01-08 DIAGNOSIS — L02.429 BOIL OF UPPER ARM AND FOREARM: ICD-10-CM

## 2024-01-08 DIAGNOSIS — G89.29 CHRONIC LOW BACK PAIN WITHOUT SCIATICA, UNSPECIFIED BACK PAIN LATERALITY: ICD-10-CM

## 2024-01-08 DIAGNOSIS — M05.79 RHEUMATOID ARTHRITIS INVOLVING MULTIPLE SITES WITH POSITIVE RHEUMATOID FACTOR (HCC): ICD-10-CM

## 2024-01-08 PROCEDURE — 1123F ACP DISCUSS/DSCN MKR DOCD: CPT | Performed by: FAMILY MEDICINE

## 2024-01-08 PROCEDURE — G0439 PPPS, SUBSEQ VISIT: HCPCS | Performed by: FAMILY MEDICINE

## 2024-01-08 RX ORDER — CEPHALEXIN 500 MG/1
500 CAPSULE ORAL 3 TIMES DAILY
Qty: 30 CAPSULE | Refills: 0 | Status: SHIPPED | OUTPATIENT
Start: 2024-01-08 | End: 2024-01-18

## 2024-01-08 ASSESSMENT — ENCOUNTER SYMPTOMS
ABDOMINAL PAIN: 0
DIARRHEA: 0
SHORTNESS OF BREATH: 0
WHEEZING: 0
CHEST TIGHTNESS: 0
VOMITING: 0
APNEA: 0
EYES NEGATIVE: 1
NAUSEA: 0

## 2024-01-08 ASSESSMENT — PATIENT HEALTH QUESTIONNAIRE - PHQ9
SUM OF ALL RESPONSES TO PHQ QUESTIONS 1-9: 0
SUM OF ALL RESPONSES TO PHQ9 QUESTIONS 1 & 2: 0
2. FEELING DOWN, DEPRESSED OR HOPELESS: 0
SUM OF ALL RESPONSES TO PHQ9 QUESTIONS 1 & 2: 0
SUM OF ALL RESPONSES TO PHQ QUESTIONS 1-9: 0
1. LITTLE INTEREST OR PLEASURE IN DOING THINGS: 0
SUM OF ALL RESPONSES TO PHQ QUESTIONS 1-9: 0
SUM OF ALL RESPONSES TO PHQ QUESTIONS 1-9: 0
2. FEELING DOWN, DEPRESSED OR HOPELESS: 0
SUM OF ALL RESPONSES TO PHQ QUESTIONS 1-9: 0
1. LITTLE INTEREST OR PLEASURE IN DOING THINGS: 0
SUM OF ALL RESPONSES TO PHQ QUESTIONS 1-9: 0

## 2024-01-08 NOTE — PROGRESS NOTES
SUBJECTIVE:  Patient ID: Nataliya Portillo is a 73 y.o. female.  Chief Complaint:  Chief Complaint   Patient presents with    Mass     Lump on left arm warm to touch New Years Bessy    Medicare AWV       HPI  73 year old Female  AWV  Mother  96 year old 2023  Left upper arm lesion start 2023?boil  Rheumatology Care     Past Medical History:   Diagnosis Date    Breast cancer (HCC)     RT     H/O gastroesophageal reflux (GERD)     Menopause ovarian failure     RA (rheumatoid arthritis) ([facility])     Rheumatology Dr Black @[facility]     Past Surgical History:   Procedure Laterality Date    BREAST BIOPSY      BREAST LUMPECTOMY      BREAST SURGERY      Right     CARPAL TUNNEL RELEASE  2012    Left    CARPAL TUNNEL RELEASE  2012    Right    COLONOSCOPY N/A 2020    COLONOSCOPY WITH BIOPSY performed by Jose Carlos MEJIA MD at [facility] ENDOSCOPY    LEG SURGERY  2021    wound care [facility]    STOMACH SURGERY  10/07/2021    PUD  done @    UPPER GASTROINTESTINAL ENDOSCOPY N/A 2020    EGD DILATION BALLOON performed by Jose Carlos MEJIA MD at [facility] ENDOSCOPY     Allergies   Allergen Reactions    Clindamycin/Lincomycin      C-Diff    Sulfa Antibiotics Hives    Polysporin [Bacitracin-Polymyxin B]     Parabens      Lip [place]   Only use Birtt bee         Patient Active Problem List   Diagnosis    Incontinence of urine    Chronic back pain    Spinal stenosis    GERD (gastroesophageal reflux disease)    History of breast cancer    Chronic venous insufficiency    Rheumatoid arthritis involving multiple sites with positive rheumatoid factor (HCC)    H/O Sjogren's disease ([facility])    History of Clostridioides difficile infection     Current Outpatient Medications   Medication Sig Dispense Refill    amitriptyline (ELAVIL) 25 MG tablet TAKE 1 TABLET NIGHTLY 90 tablet 3    furosemide (LASIX) 40 MG tablet TAKE 1 TABLET ONCE DAILY 90 tablet 1    clotrimazole-betamethasone (LOTRISONE) 1-0.05

## 2024-02-20 ENCOUNTER — OFFICE VISIT (OUTPATIENT)
Dept: PRIMARY CARE CLINIC | Age: 74
End: 2024-02-20
Payer: MEDICARE

## 2024-02-20 VITALS
WEIGHT: 168.8 LBS | OXYGEN SATURATION: 99 % | TEMPERATURE: 97.3 F | SYSTOLIC BLOOD PRESSURE: 130 MMHG | HEART RATE: 91 BPM | BODY MASS INDEX: 30.63 KG/M2 | DIASTOLIC BLOOD PRESSURE: 68 MMHG

## 2024-02-20 DIAGNOSIS — J06.9 UPPER RESPIRATORY TRACT INFECTION, UNSPECIFIED TYPE: Primary | ICD-10-CM

## 2024-02-20 DIAGNOSIS — R05.1 ACUTE COUGH: ICD-10-CM

## 2024-02-20 LAB
Lab: NORMAL
QC PASS/FAIL: NORMAL
SARS-COV-2 RDRP RESP QL NAA+PROBE: NEGATIVE

## 2024-02-20 PROCEDURE — 1123F ACP DISCUSS/DSCN MKR DOCD: CPT | Performed by: NURSE PRACTITIONER

## 2024-02-20 PROCEDURE — 87635 SARS-COV-2 COVID-19 AMP PRB: CPT | Performed by: NURSE PRACTITIONER

## 2024-02-20 PROCEDURE — 99213 OFFICE O/P EST LOW 20 MIN: CPT | Performed by: NURSE PRACTITIONER

## 2024-02-20 RX ORDER — AZITHROMYCIN 250 MG/1
TABLET, FILM COATED ORAL
Qty: 1 PACKET | Refills: 0 | Status: SHIPPED | OUTPATIENT
Start: 2024-02-20

## 2024-02-20 RX ORDER — GUAIFENESIN 600 MG/1
600 TABLET, EXTENDED RELEASE ORAL 2 TIMES DAILY
Qty: 30 TABLET | Refills: 0 | Status: SHIPPED | OUTPATIENT
Start: 2024-02-20 | End: 2024-03-06

## 2024-02-20 RX ORDER — BENZONATATE 200 MG/1
200 CAPSULE ORAL 3 TIMES DAILY PRN
Qty: 30 CAPSULE | Refills: 0 | Status: SHIPPED | OUTPATIENT
Start: 2024-02-20 | End: 2024-03-01

## 2024-02-20 ASSESSMENT — ENCOUNTER SYMPTOMS
ABDOMINAL PAIN: 0
SHORTNESS OF BREATH: 0
WHEEZING: 0
TROUBLE SWALLOWING: 0
SINUS PAIN: 0
CHEST TIGHTNESS: 0
SINUS PRESSURE: 0
COUGH: 1
VOMITING: 0
DIARRHEA: 0
RHINORRHEA: 0
SORE THROAT: 0
NAUSEA: 0

## 2024-03-13 ENCOUNTER — PATIENT MESSAGE (OUTPATIENT)
Dept: PRIMARY CARE CLINIC | Age: 74
End: 2024-03-13

## 2024-03-13 NOTE — TELEPHONE ENCOUNTER
From: Nataliya Portillo  To: Dr. Jillian Madrid  Sent: 3/13/2024 4:28 PM EDT  Subject: is appointment necessary?    Dr. Madrid, I have a re-occurrence of something you treated me for a year ago...I'm assuming I need to make an appointment to come in and see you. I know I saw you just this past January for an abcess on my upper left arm (bicep area) which you prescribed an antibiotic for me and Dr. Mera excised it. Well, I have had another 'boil' suddenly appear on Tuesday of this week (March 13) in my left armpit. It is painful, showing pink, and is between the size of a pea and a marble under the skin. You treated me for this exact same thing on January 27th of 2023; you prescribed cephalexin. Do you want me to make an appointment to come in and see you sometime this week? Or is it something you can call in an antibiotic for? I have an eye doctor qian't on Friday so I'll need to work around that appointment. Thanks for getting back to me about this. Yoselin

## 2024-03-15 DIAGNOSIS — G89.29 CHRONIC LOW BACK PAIN WITHOUT SCIATICA, UNSPECIFIED BACK PAIN LATERALITY: ICD-10-CM

## 2024-03-15 DIAGNOSIS — M54.50 CHRONIC LOW BACK PAIN WITHOUT SCIATICA, UNSPECIFIED BACK PAIN LATERALITY: ICD-10-CM

## 2024-03-15 NOTE — TELEPHONE ENCOUNTER
Medication:   Requested Prescriptions     Pending Prescriptions Disp Refills    gabapentin (NEURONTIN) 300 MG capsule [Pharmacy Med Name: Gabapentin Oral Capsule 300 MG] 180 capsule 0     Sig: TAKE 1 CAPSULE BY MOUTH 2 TIMES A DAY     Last Filled:  6.19.23    Last appt: 1/8/24  Next appt: 3/18/2024    Last OARRS:       6/29/2020     5:11 PM   RX Monitoring   Periodic Controlled Substance Monitoring No signs of potential drug abuse or diversion identified.

## 2024-03-18 ENCOUNTER — OFFICE VISIT (OUTPATIENT)
Dept: PRIMARY CARE CLINIC | Age: 74
End: 2024-03-18
Payer: MEDICARE

## 2024-03-18 VITALS
OXYGEN SATURATION: 100 % | BODY MASS INDEX: 30.12 KG/M2 | HEART RATE: 90 BPM | RESPIRATION RATE: 16 BRPM | SYSTOLIC BLOOD PRESSURE: 124 MMHG | WEIGHT: 170 LBS | TEMPERATURE: 96.8 F | HEIGHT: 63 IN | DIASTOLIC BLOOD PRESSURE: 78 MMHG

## 2024-03-18 DIAGNOSIS — M05.79 RHEUMATOID ARTHRITIS INVOLVING MULTIPLE SITES WITH POSITIVE RHEUMATOID FACTOR (HCC): ICD-10-CM

## 2024-03-18 DIAGNOSIS — Z86.14 HX OF METHICILLIN RESISTANT STAPHYLOCOCCUS AUREUS: ICD-10-CM

## 2024-03-18 DIAGNOSIS — L02.412 ABSCESS OF LEFT AXILLA: Primary | ICD-10-CM

## 2024-03-18 PROCEDURE — 99214 OFFICE O/P EST MOD 30 MIN: CPT | Performed by: STUDENT IN AN ORGANIZED HEALTH CARE EDUCATION/TRAINING PROGRAM

## 2024-03-18 PROCEDURE — 1123F ACP DISCUSS/DSCN MKR DOCD: CPT | Performed by: STUDENT IN AN ORGANIZED HEALTH CARE EDUCATION/TRAINING PROGRAM

## 2024-03-18 RX ORDER — CEPHALEXIN 500 MG/1
500 CAPSULE ORAL 3 TIMES DAILY
Qty: 30 CAPSULE | Refills: 0 | Status: SHIPPED | OUTPATIENT
Start: 2024-03-18 | End: 2024-03-28

## 2024-03-18 RX ORDER — GABAPENTIN 300 MG/1
CAPSULE ORAL
Qty: 180 CAPSULE | Refills: 1 | Status: SHIPPED | OUTPATIENT
Start: 2024-03-18 | End: 2024-09-16

## 2024-03-18 NOTE — PROGRESS NOTES
06/10/2019     Lab Results   Component Value Date    HDL 50 08/08/2023    HDL 41 06/10/2019    HDL 56 02/23/2018     Lab Results   Component Value Date    LDLCALC 98 08/08/2023    LDLCALC 85 06/10/2019    LDLCALC 115 (H) 02/23/2018     No results found for: \"PSA\", \"PSADIA\"  No results found for: \"IRPVHEDC50\"  Lab Results   Component Value Date/Time    VITD25 53.6 06/10/2019 03:16 PM          No results found for: \"KNLR37GHI\"    Review of Systems    Physical Exam  Constitutional:       Appearance: She is not ill-appearing.   Skin:     Findings: Lesion (left axilla firm indurated red nodule - see photo) present.   Neurological:      Mental Status: She is alert.           Immunization History   Administered Date(s) Administered    COVID-19, MODERNA, (2023-24 formula), (age 12y+), IM, 50mcg/0.5mL 11/02/2023    COVID-19, PFIZER Bivalent, DO NOT Dilute, (age 12y+), IM, 30 mcg/0.3 mL 11/15/2022    COVID-19, PFIZER GRAY top, DO NOT Dilute, (age 12 y+), IM, 30 mcg/0.3 mL 04/19/2022    COVID-19, PFIZER PURPLE top, DILUTE for use, (age 12 y+), 30mcg/0.3mL 03/23/2021, 04/13/2021, 10/21/2021    Influenza Vaccine, unspecified formulation 08/28/2016    Influenza Whole 12/21/2015    Influenza, FLUAD, (age 65 y+), Adjuvanted, 0.5mL 12/15/2021    Influenza, High Dose (Fluzone 65 yrs and older) 09/28/2017, 10/17/2018, 09/17/2019, 10/01/2020    Influenza, Intradermal, Preservative free 01/08/2014    Pneumococcal, PCV-13, PREVNAR 13, (age 6w+), IM, 0.5mL 05/11/2016, 09/28/2017    Pneumococcal, PPSV23, PNEUMOVAX 23, (age 2y+), SC/IM, 0.5mL 07/13/2020    TDaP, ADACEL (age 10y-64y), BOOSTRIX (age 10y+), IM, 0.5mL 05/11/2016    Td, unspecified formulation 11/30/2009    Zoster Live (Zostavax) 09/07/2015    Zoster Recombinant (Shingrix) 10/17/2018, 01/04/2019       Health Maintenance Due   Topic Date Due    Breast cancer screen  07/31/2021       Food Insecurity: Not on file (6/30/2023)       Assessment / Plan:   1. Abscess of left

## 2024-03-28 ENCOUNTER — OFFICE VISIT (OUTPATIENT)
Dept: PRIMARY CARE CLINIC | Age: 74
End: 2024-03-28

## 2024-03-28 ENCOUNTER — HOSPITAL ENCOUNTER (OUTPATIENT)
Age: 74
Discharge: HOME | End: 2024-03-28
Attending: STUDENT IN AN ORGANIZED HEALTH CARE EDUCATION/TRAINING PROGRAM
Payer: MEDICARE

## 2024-03-28 VITALS
TEMPERATURE: 97.2 F | RESPIRATION RATE: 16 BRPM | OXYGEN SATURATION: 98 % | HEART RATE: 97 BPM | BODY MASS INDEX: 30.42 KG/M2 | DIASTOLIC BLOOD PRESSURE: 80 MMHG | SYSTOLIC BLOOD PRESSURE: 128 MMHG | WEIGHT: 169 LBS

## 2024-03-28 DIAGNOSIS — I87.2 CHRONIC VENOUS INSUFFICIENCY: ICD-10-CM

## 2024-03-28 DIAGNOSIS — L03.116 CELLULITIS OF LEFT LOWER EXTREMITY: ICD-10-CM

## 2024-03-28 DIAGNOSIS — M79.89 PAIN AND SWELLING OF LEFT LOWER LEG: ICD-10-CM

## 2024-03-28 DIAGNOSIS — M35.00 H/O SJOGREN'S DISEASE (HCC): ICD-10-CM

## 2024-03-28 DIAGNOSIS — M79.89 PAIN AND SWELLING OF LEFT LOWER LEG: Primary | ICD-10-CM

## 2024-03-28 DIAGNOSIS — M79.662 PAIN AND SWELLING OF LEFT LOWER LEG: ICD-10-CM

## 2024-03-28 DIAGNOSIS — M05.79 RHEUMATOID ARTHRITIS INVOLVING MULTIPLE SITES WITH POSITIVE RHEUMATOID FACTOR (HCC): ICD-10-CM

## 2024-03-28 DIAGNOSIS — M79.662 PAIN AND SWELLING OF LEFT LOWER LEG: Primary | ICD-10-CM

## 2024-03-28 PROCEDURE — 93971 EXTREMITY STUDY: CPT

## 2024-03-28 RX ORDER — CEPHALEXIN 500 MG/1
500 CAPSULE ORAL 3 TIMES DAILY
Qty: 30 CAPSULE | Refills: 0 | Status: SHIPPED | OUTPATIENT
Start: 2024-03-28 | End: 2024-04-07

## 2024-03-28 NOTE — PROGRESS NOTES
[facility]  [facility]  [address] [place] [address]  [address]  [facility] [address]  Dept: [phone]  Dept Fax: [phone]  Loc: [phone]      Nataliya Portillo is a 73 y.o. female who presents today for:  Chief Complaint   Patient presents with    Leg Pain     Pt thinks she has cellulitis      HPI:   Nataliya Portillo is 73 y.o. presents today for left lower extremity swelling and redness. History of MRSA requiring ICU admission, wound care, and wound vac and chronic abx. Had abscess in left axilla 10 days ago, improved with warm compresses and keflex. Now with left lower leg swelling, tenderness and warmth. Left leg significantly more swollen than right.       Objective:     Vitals:    03/28/24 0946   BP: 128/80   Pulse: 97   Resp: 16   Temp: 97.2 °F (36.2 °C)   SpO2: 98%         Wt Readings from Last 3 Encounters:   03/28/24 76.7 kg (169 lb)   03/18/24 77.1 kg (170 lb)   02/20/24 76.6 kg (168 lb 12.8 oz)       BP Readings from Last 3 Encounters:   03/28/24 128/80   03/18/24 124/78   02/20/24 130/68       Lab Results   Component Value Date    WBC 7.1 08/08/2023    HGB 12.5 08/08/2023    HCT 37.4 08/08/2023    MCV 92.2 08/08/2023     08/08/2023     Lab Results   Component Value Date     08/08/2023    K 4.2 08/08/2023     08/08/2023    CO2 22 08/08/2023    BUN 20 08/08/2023    CREATININE 0.7 08/08/2023    GLUCOSE 86 08/08/2023    CALCIUM 9.0 08/08/2023    PROT 7.5 08/08/2023    LABALBU 4.2 08/08/2023    BILITOT 0.3 08/08/2023    ALKPHOS 51 08/08/2023    AST 23 08/08/2023    ALT 15 08/08/2023    LABGLOM >60 08/08/2023    GFRAA >60 06/10/2019    AGRATIO 1.3 08/08/2023    GLOB 2.9 06/10/2019     Lab Results   Component Value Date    TSH 1.82 06/10/2019     Lab Results   Component Value Date    LABA1C 5.0 07/22/2020    LABA1C 5.0 07/22/2020     Lab Results   Component Value Date    .8 06/10/2019     Lab Results   Component Value Date    CHOL 160 08/08/2023

## 2024-04-05 ENCOUNTER — OFFICE VISIT (OUTPATIENT)
Dept: PRIMARY CARE CLINIC | Age: 74
End: 2024-04-05

## 2024-04-05 VITALS
DIASTOLIC BLOOD PRESSURE: 60 MMHG | WEIGHT: 169.2 LBS | TEMPERATURE: 97.2 F | SYSTOLIC BLOOD PRESSURE: 120 MMHG | HEART RATE: 88 BPM | BODY MASS INDEX: 30.45 KG/M2 | OXYGEN SATURATION: 98 %

## 2024-04-05 DIAGNOSIS — I87.2 CHRONIC VENOUS INSUFFICIENCY: Primary | ICD-10-CM

## 2024-04-05 DIAGNOSIS — L03.116 CELLULITIS OF LEFT LOWER EXTREMITY: ICD-10-CM

## 2024-04-05 DIAGNOSIS — M81.0 AGE RELATED OSTEOPOROSIS, UNSPECIFIED PATHOLOGICAL FRACTURE PRESENCE: ICD-10-CM

## 2024-04-05 DIAGNOSIS — M05.79 RHEUMATOID ARTHRITIS INVOLVING MULTIPLE SITES WITH POSITIVE RHEUMATOID FACTOR (HCC): ICD-10-CM

## 2024-04-05 ASSESSMENT — ENCOUNTER SYMPTOMS
APNEA: 0
WHEEZING: 0
BACK PAIN: 1
SHORTNESS OF BREATH: 0

## 2024-04-05 ASSESSMENT — PATIENT HEALTH QUESTIONNAIRE - PHQ9
SUM OF ALL RESPONSES TO PHQ QUESTIONS 1-9: 0
2. FEELING DOWN, DEPRESSED OR HOPELESS: NOT AT ALL
SUM OF ALL RESPONSES TO PHQ9 QUESTIONS 1 & 2: 0
SUM OF ALL RESPONSES TO PHQ QUESTIONS 1-9: 0
1. LITTLE INTEREST OR PLEASURE IN DOING THINGS: NOT AT ALL

## 2024-04-05 NOTE — PROGRESS NOTES
SUBJECTIVE:  Patient ID: Nataliya Portillo is a 73 y.o. female.  Chief Complaint:  Chief Complaint   Patient presents with    Leg Pain     Follow up  still pink left        HPI  73 year old Female  Pt was seen 3/28/24 for pain & swelling Lt LE  Pt was on Rx Keflex for abscess Lt arm pit  & mid upper arm Lt   Pt will finish Keflex during weekend  Dx+osteoporosis get Infusion Reclast once a year   Dx Rheumatoid Arthritis Rx Rinvoq QD Pt had to hold Rinvoq while on antibiotic  Rheumatology visit last month     Past Medical History:   Diagnosis Date    Breast cancer ([facility])     RT     H/O gastroesophageal reflux (GERD)     Menopause ovarian failure     RA (rheumatoid arthritis) ([facility])     Rheumatology Dr Black @[facility]     Past Surgical History:   Procedure Laterality Date    BREAST BIOPSY      BREAST LUMPECTOMY      BREAST SURGERY      Right     CARPAL TUNNEL RELEASE  2012    Left    CARPAL TUNNEL RELEASE  2012    Right    COLONOSCOPY N/A 2020    COLONOSCOPY WITH BIOPSY performed by Jose Carlos MEJIA MD at [facility] ENDOSCOPY    EYE SURGERY      LEG SURGERY  2021    wound care 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

## 2024-08-09 ENCOUNTER — OFFICE VISIT (OUTPATIENT)
Dept: PRIMARY CARE CLINIC | Age: 74
End: 2024-08-09

## 2024-08-09 VITALS
RESPIRATION RATE: 16 BRPM | HEIGHT: 63 IN | BODY MASS INDEX: 29.41 KG/M2 | OXYGEN SATURATION: 96 % | HEART RATE: 111 BPM | WEIGHT: 166 LBS | SYSTOLIC BLOOD PRESSURE: 118 MMHG | DIASTOLIC BLOOD PRESSURE: 82 MMHG | TEMPERATURE: 97.1 F

## 2024-08-09 DIAGNOSIS — M35.00 H/O SJOGREN'S DISEASE (HCC): ICD-10-CM

## 2024-08-09 DIAGNOSIS — M05.79 RHEUMATOID ARTHRITIS INVOLVING MULTIPLE SITES WITH POSITIVE RHEUMATOID FACTOR (HCC): ICD-10-CM

## 2024-08-09 DIAGNOSIS — N30.00 ACUTE CYSTITIS WITHOUT HEMATURIA: Primary | ICD-10-CM

## 2024-08-09 LAB
BILIRUBIN, POC: NORMAL
BLOOD URINE, POC: NORMAL
CLARITY, POC: CLEAR
COLOR, POC: YELLOW
GLUCOSE URINE, POC: NORMAL
KETONES, POC: NORMAL
LEUKOCYTE EST, POC: NORMAL
NITRITE, POC: NORMAL
PH, POC: 5.5
PROTEIN, POC: 30
SPECIFIC GRAVITY, POC: 1.03
UROBILINOGEN, POC: 0.2

## 2024-08-09 RX ORDER — NITROFURANTOIN 25; 75 MG/1; MG/1
100 CAPSULE ORAL 2 TIMES DAILY
Qty: 20 CAPSULE | Refills: 0 | Status: SHIPPED | OUTPATIENT
Start: 2024-08-09 | End: 2024-08-19

## 2024-08-09 SDOH — ECONOMIC STABILITY: FOOD INSECURITY: WITHIN THE PAST 12 MONTHS, THE FOOD YOU BOUGHT JUST DIDN'T LAST AND YOU DIDN'T HAVE MONEY TO GET MORE.: NEVER TRUE

## 2024-08-09 SDOH — ECONOMIC STABILITY: FOOD INSECURITY: WITHIN THE PAST 12 MONTHS, YOU WORRIED THAT YOUR FOOD WOULD RUN OUT BEFORE YOU GOT MONEY TO BUY MORE.: NEVER TRUE

## 2024-08-09 SDOH — ECONOMIC STABILITY: INCOME INSECURITY: HOW HARD IS IT FOR YOU TO PAY FOR THE VERY BASICS LIKE FOOD, HOUSING, MEDICAL CARE, AND HEATING?: NOT HARD AT ALL

## 2024-08-09 ASSESSMENT — PATIENT HEALTH QUESTIONNAIRE - PHQ9
SUM OF ALL RESPONSES TO PHQ QUESTIONS 1-9: 0
10. IF YOU CHECKED OFF ANY PROBLEMS, HOW DIFFICULT HAVE THESE PROBLEMS MADE IT FOR YOU TO DO YOUR WORK, TAKE CARE OF THINGS AT HOME, OR GET ALONG WITH OTHER PEOPLE: NOT DIFFICULT AT ALL
1. LITTLE INTEREST OR PLEASURE IN DOING THINGS: NOT AT ALL
2. FEELING DOWN, DEPRESSED OR HOPELESS: NOT AT ALL
SUM OF ALL RESPONSES TO PHQ9 QUESTIONS 1 & 2: 0
8. MOVING OR SPEAKING SO SLOWLY THAT OTHER PEOPLE COULD HAVE NOTICED. OR THE OPPOSITE, BEING SO FIGETY OR RESTLESS THAT YOU HAVE BEEN MOVING AROUND A LOT MORE THAN USUAL: NOT AT ALL
5. POOR APPETITE OR OVEREATING: NOT AT ALL
7. TROUBLE CONCENTRATING ON THINGS, SUCH AS READING THE NEWSPAPER OR WATCHING TELEVISION: NOT AT ALL
3. TROUBLE FALLING OR STAYING ASLEEP: NOT AT ALL
6. FEELING BAD ABOUT YOURSELF - OR THAT YOU ARE A FAILURE OR HAVE LET YOURSELF OR YOUR FAMILY DOWN: NOT AT ALL
SUM OF ALL RESPONSES TO PHQ QUESTIONS 1-9: 0
9. THOUGHTS THAT YOU WOULD BE BETTER OFF DEAD, OR OF HURTING YOURSELF: NOT AT ALL
SUM OF ALL RESPONSES TO PHQ QUESTIONS 1-9: 0
4. FEELING TIRED OR HAVING LITTLE ENERGY: NOT AT ALL
SUM OF ALL RESPONSES TO PHQ QUESTIONS 1-9: 0

## 2024-08-09 ASSESSMENT — ANXIETY QUESTIONNAIRES
3. WORRYING TOO MUCH ABOUT DIFFERENT THINGS: NOT AT ALL
GAD7 TOTAL SCORE: 0
4. TROUBLE RELAXING: NOT AT ALL
6. BECOMING EASILY ANNOYED OR IRRITABLE: NOT AT ALL
5. BEING SO RESTLESS THAT IT IS HARD TO SIT STILL: NOT AT ALL
1. FEELING NERVOUS, ANXIOUS, OR ON EDGE: NOT AT ALL
2. NOT BEING ABLE TO STOP OR CONTROL WORRYING: NOT AT ALL
7. FEELING AFRAID AS IF SOMETHING AWFUL MIGHT HAPPEN: NOT AT ALL
IF YOU CHECKED OFF ANY PROBLEMS ON THIS QUESTIONNAIRE, HOW DIFFICULT HAVE THESE PROBLEMS MADE IT FOR YOU TO DO YOUR WORK, TAKE CARE OF THINGS AT HOME, OR GET ALONG WITH OTHER PEOPLE: NOT DIFFICULT AT ALL

## 2024-08-11 LAB
BACTERIA UR CULT: ABNORMAL
BACTERIA UR CULT: ABNORMAL
ORGANISM: ABNORMAL

## 2024-08-12 LAB
BACTERIA UR CULT: ABNORMAL
BACTERIA UR CULT: ABNORMAL
ORGANISM: ABNORMAL

## 2024-09-02 ENCOUNTER — PATIENT MESSAGE (OUTPATIENT)
Dept: PRIMARY CARE CLINIC | Age: 74
End: 2024-09-02

## 2024-09-02 DIAGNOSIS — M54.50 CHRONIC LOW BACK PAIN WITHOUT SCIATICA, UNSPECIFIED BACK PAIN LATERALITY: ICD-10-CM

## 2024-09-02 DIAGNOSIS — G89.29 CHRONIC LOW BACK PAIN WITHOUT SCIATICA, UNSPECIFIED BACK PAIN LATERALITY: ICD-10-CM

## 2024-09-03 NOTE — TELEPHONE ENCOUNTER
Medication:   Requested Prescriptions     Pending Prescriptions Disp Refills    amitriptyline (ELAVIL) 25 MG tablet 90 tablet 1     Sig: TAKE 1 TABLET NIGHTLY     Last Filled:  11/20/2023    Last appt: 8/9/2024   Next appt: Visit date not found    Last OARRS:       6/29/2020     5:11 PM   RX Monitoring   Periodic Controlled Substance Monitoring No signs of potential drug abuse or diversion identified.

## 2024-09-11 ENCOUNTER — TELEPHONE (OUTPATIENT)
Dept: PRIMARY CARE CLINIC | Age: 74
End: 2024-09-11

## 2024-09-24 DIAGNOSIS — M54.50 CHRONIC LOW BACK PAIN WITHOUT SCIATICA, UNSPECIFIED BACK PAIN LATERALITY: ICD-10-CM

## 2024-09-24 DIAGNOSIS — G89.29 CHRONIC LOW BACK PAIN WITHOUT SCIATICA, UNSPECIFIED BACK PAIN LATERALITY: ICD-10-CM

## 2024-09-24 RX ORDER — GABAPENTIN 300 MG/1
CAPSULE ORAL
Qty: 180 CAPSULE | Refills: 0 | Status: SHIPPED | OUTPATIENT
Start: 2024-09-24 | End: 2024-12-09

## 2024-09-27 SDOH — HEALTH STABILITY: PHYSICAL HEALTH: ON AVERAGE, HOW MANY DAYS PER WEEK DO YOU ENGAGE IN MODERATE TO STRENUOUS EXERCISE (LIKE A BRISK WALK)?: 1 DAY

## 2024-09-27 SDOH — HEALTH STABILITY: PHYSICAL HEALTH: ON AVERAGE, HOW MANY MINUTES DO YOU ENGAGE IN EXERCISE AT THIS LEVEL?: 10 MIN

## 2024-09-30 ENCOUNTER — PATIENT MESSAGE (OUTPATIENT)
Dept: PRIMARY CARE CLINIC | Age: 74
End: 2024-09-30

## 2024-09-30 ENCOUNTER — OFFICE VISIT (OUTPATIENT)
Dept: PRIMARY CARE CLINIC | Age: 74
End: 2024-09-30

## 2024-09-30 VITALS
HEART RATE: 112 BPM | SYSTOLIC BLOOD PRESSURE: 110 MMHG | OXYGEN SATURATION: 97 % | BODY MASS INDEX: 29.41 KG/M2 | DIASTOLIC BLOOD PRESSURE: 76 MMHG | TEMPERATURE: 96.8 F | RESPIRATION RATE: 16 BRPM | WEIGHT: 166 LBS | HEIGHT: 63 IN

## 2024-09-30 DIAGNOSIS — G89.4 CHRONIC PAIN SYNDROME: Primary | ICD-10-CM

## 2024-09-30 DIAGNOSIS — M05.79 RHEUMATOID ARTHRITIS INVOLVING MULTIPLE SITES WITH POSITIVE RHEUMATOID FACTOR (HCC): ICD-10-CM

## 2024-09-30 DIAGNOSIS — M35.00 H/O SJOGREN'S DISEASE (HCC): ICD-10-CM

## 2024-09-30 DIAGNOSIS — G89.29 CHRONIC LOW BACK PAIN WITHOUT SCIATICA, UNSPECIFIED BACK PAIN LATERALITY: ICD-10-CM

## 2024-09-30 DIAGNOSIS — I87.2 CHRONIC VENOUS INSUFFICIENCY: ICD-10-CM

## 2024-09-30 DIAGNOSIS — M54.50 CHRONIC LOW BACK PAIN WITHOUT SCIATICA, UNSPECIFIED BACK PAIN LATERALITY: ICD-10-CM

## 2024-09-30 DIAGNOSIS — G89.29 CHRONIC NONINTRACTABLE HEADACHE, UNSPECIFIED HEADACHE TYPE: ICD-10-CM

## 2024-09-30 DIAGNOSIS — R51.9 CHRONIC NONINTRACTABLE HEADACHE, UNSPECIFIED HEADACHE TYPE: ICD-10-CM

## 2024-09-30 DIAGNOSIS — Z12.39 BREAST SCREENING: ICD-10-CM

## 2024-09-30 DIAGNOSIS — T14.8XXA OPEN WOUND: ICD-10-CM

## 2024-09-30 RX ORDER — CLOTRIMAZOLE AND BETAMETHASONE DIPROPIONATE 10; .64 MG/G; MG/G
CREAM TOPICAL
Qty: 30 G | Refills: 5 | Status: SHIPPED | OUTPATIENT
Start: 2024-09-30 | End: 2024-09-30

## 2024-09-30 RX ORDER — GABAPENTIN 300 MG/1
CAPSULE ORAL
Qty: 180 CAPSULE | Refills: 1 | Status: SHIPPED | OUTPATIENT
Start: 2024-09-30 | End: 2024-12-15

## 2024-09-30 ASSESSMENT — PATIENT HEALTH QUESTIONNAIRE - PHQ9
SUM OF ALL RESPONSES TO PHQ QUESTIONS 1-9: 0
SUM OF ALL RESPONSES TO PHQ9 QUESTIONS 1 & 2: 0
1. LITTLE INTEREST OR PLEASURE IN DOING THINGS: NOT AT ALL
SUM OF ALL RESPONSES TO PHQ QUESTIONS 1-9: 0
SUM OF ALL RESPONSES TO PHQ QUESTIONS 1-9: 0
7. TROUBLE CONCENTRATING ON THINGS, SUCH AS READING THE NEWSPAPER OR WATCHING TELEVISION: NOT AT ALL
3. TROUBLE FALLING OR STAYING ASLEEP: NOT AT ALL
6. FEELING BAD ABOUT YOURSELF - OR THAT YOU ARE A FAILURE OR HAVE LET YOURSELF OR YOUR FAMILY DOWN: NOT AT ALL
8. MOVING OR SPEAKING SO SLOWLY THAT OTHER PEOPLE COULD HAVE NOTICED. OR THE OPPOSITE, BEING SO FIGETY OR RESTLESS THAT YOU HAVE BEEN MOVING AROUND A LOT MORE THAN USUAL: NOT AT ALL
10. IF YOU CHECKED OFF ANY PROBLEMS, HOW DIFFICULT HAVE THESE PROBLEMS MADE IT FOR YOU TO DO YOUR WORK, TAKE CARE OF THINGS AT HOME, OR GET ALONG WITH OTHER PEOPLE: NOT DIFFICULT AT ALL
2. FEELING DOWN, DEPRESSED OR HOPELESS: NOT AT ALL
5. POOR APPETITE OR OVEREATING: NOT AT ALL
9. THOUGHTS THAT YOU WOULD BE BETTER OFF DEAD, OR OF HURTING YOURSELF: NOT AT ALL
4. FEELING TIRED OR HAVING LITTLE ENERGY: NOT AT ALL
SUM OF ALL RESPONSES TO PHQ QUESTIONS 1-9: 0

## 2024-09-30 ASSESSMENT — ANXIETY QUESTIONNAIRES
IF YOU CHECKED OFF ANY PROBLEMS ON THIS QUESTIONNAIRE, HOW DIFFICULT HAVE THESE PROBLEMS MADE IT FOR YOU TO DO YOUR WORK, TAKE CARE OF THINGS AT HOME, OR GET ALONG WITH OTHER PEOPLE: NOT DIFFICULT AT ALL
5. BEING SO RESTLESS THAT IT IS HARD TO SIT STILL: NOT AT ALL
1. FEELING NERVOUS, ANXIOUS, OR ON EDGE: NOT AT ALL
7. FEELING AFRAID AS IF SOMETHING AWFUL MIGHT HAPPEN: NOT AT ALL
2. NOT BEING ABLE TO STOP OR CONTROL WORRYING: NOT AT ALL
3. WORRYING TOO MUCH ABOUT DIFFERENT THINGS: NOT AT ALL
6. BECOMING EASILY ANNOYED OR IRRITABLE: NOT AT ALL
GAD7 TOTAL SCORE: 0
4. TROUBLE RELAXING: NOT AT ALL

## 2024-09-30 ASSESSMENT — ENCOUNTER SYMPTOMS
RESPIRATORY NEGATIVE: 1
EYES NEGATIVE: 1
GASTROINTESTINAL NEGATIVE: 1

## 2024-09-30 NOTE — PROGRESS NOTES
Lymphadenopathy:      Cervical: No cervical adenopathy.   Skin:     Findings: No rash.      Comments: Skin discolored lower extremity with multiple scars and open wound on the lateral left leg below the knee   Neurological:      Mental Status: She is alert and oriented to person, place, and time.         ASSESSMENT:   Diagnosis Orders   1. Chronic pain syndrome        2. Breast screening  TIMOTHY DIGITAL SCREEN W OR WO CAD BILATERAL      3. Chronic low back pain without sciatica, unspecified back pain laterality  gabapentin (NEURONTIN) 300 MG capsule      4. Rheumatoid arthritis involving multiple sites with positive rheumatoid factor (HCC)        5. H/O Sjogren's disease (HCC)        6. Chronic nonintractable headache, unspecified headache type        7. Chronic venous insufficiency        8. Open wound              PLAN:    See orders  Follow-up with rheumatology/pain management  Wound care discussed with the patient may need to be seen by wound care if not better

## 2024-10-01 DIAGNOSIS — Z86.14 HX OF METHICILLIN RESISTANT STAPHYLOCOCCUS AUREUS: ICD-10-CM

## 2024-10-01 DIAGNOSIS — L02.412 ABSCESS OF LEFT AXILLA: ICD-10-CM

## 2024-10-01 RX ORDER — MUPIROCIN 20 MG/G
OINTMENT TOPICAL
Qty: 15 G | Refills: 0 | Status: SHIPPED | OUTPATIENT
Start: 2024-10-01 | End: 2024-10-08

## 2024-10-01 NOTE — TELEPHONE ENCOUNTER
I corrected the Rinvoq and added dosage for Hyrdroxychloroquine.    Pt states Mupircin  ointment was not called to the pharmacy sent refill request to Dr. Castillo.

## 2024-10-01 NOTE — TELEPHONE ENCOUNTER
Medication:   Requested Prescriptions     Pending Prescriptions Disp Refills    mupirocin (BACTROBAN) 2 % ointment 15 g 0     Sig: Apply topically 3 times daily.     Last Filled:  3.25.224    Last appt: 9/30/2024   Next appt: 10/17/2024    Last OARRS:       6/29/2020     5:11 PM   RX Monitoring   Periodic Controlled Substance Monitoring No signs of potential drug abuse or diversion identified.

## 2024-10-07 ENCOUNTER — PATIENT MESSAGE (OUTPATIENT)
Dept: PRIMARY CARE CLINIC | Age: 74
End: 2024-10-07

## 2024-10-09 ENCOUNTER — PATIENT MESSAGE (OUTPATIENT)
Dept: PRIMARY CARE CLINIC | Age: 74
End: 2024-10-09

## 2024-10-17 ENCOUNTER — OFFICE VISIT (OUTPATIENT)
Dept: PRIMARY CARE CLINIC | Age: 74
End: 2024-10-17

## 2024-10-17 VITALS
TEMPERATURE: 97.6 F | SYSTOLIC BLOOD PRESSURE: 134 MMHG | BODY MASS INDEX: 29.88 KG/M2 | HEART RATE: 99 BPM | OXYGEN SATURATION: 99 % | DIASTOLIC BLOOD PRESSURE: 76 MMHG | RESPIRATION RATE: 16 BRPM | WEIGHT: 166 LBS

## 2024-10-17 DIAGNOSIS — M35.00 H/O SJOGREN'S DISEASE (HCC): ICD-10-CM

## 2024-10-17 DIAGNOSIS — Z01.811 PRE-OP CHEST EXAM: Primary | ICD-10-CM

## 2024-10-17 DIAGNOSIS — K21.9 GASTROESOPHAGEAL REFLUX DISEASE WITHOUT ESOPHAGITIS: ICD-10-CM

## 2024-10-17 DIAGNOSIS — G89.29 CHRONIC LOW BACK PAIN WITHOUT SCIATICA, UNSPECIFIED BACK PAIN LATERALITY: ICD-10-CM

## 2024-10-17 DIAGNOSIS — M48.00 SPINAL STENOSIS, UNSPECIFIED SPINAL REGION: ICD-10-CM

## 2024-10-17 DIAGNOSIS — M54.50 CHRONIC LOW BACK PAIN WITHOUT SCIATICA, UNSPECIFIED BACK PAIN LATERALITY: ICD-10-CM

## 2024-10-17 DIAGNOSIS — G89.4 CHRONIC PAIN SYNDROME: ICD-10-CM

## 2024-10-17 DIAGNOSIS — I87.2 CHRONIC VENOUS INSUFFICIENCY: ICD-10-CM

## 2024-10-17 DIAGNOSIS — M05.79 RHEUMATOID ARTHRITIS INVOLVING MULTIPLE SITES WITH POSITIVE RHEUMATOID FACTOR (HCC): ICD-10-CM

## 2024-10-17 ASSESSMENT — ENCOUNTER SYMPTOMS
RESPIRATORY NEGATIVE: 1
GASTROINTESTINAL NEGATIVE: 1
EYES NEGATIVE: 1
ALLERGIC/IMMUNOLOGIC NEGATIVE: 1
BACK PAIN: 1

## 2024-10-17 NOTE — PROGRESS NOTES
Stress : Not at all   Social Connections: Moderately Integrated (4/11/2022)    Social Connection and Isolation Panel [NHANES]     Frequency of Communication with Friends and Family: More than three times a week     Frequency of Social Gatherings with Friends and Family: More than three times a week     Attends Muslim Services: More than 4 times per year     Active Member of Clubs or Organizations: No     Attends Club or Organization Meetings: Never     Marital Status:     [facility]    Interpersonal Safety   Housing Stability: Unknown (8/9/2024)    Housing Stability Vital Sign     Unstable Housing in the Last Year: No     Current Outpatient Medications   Medication Sig Dispense Refill    gabapentin (NEURONTIN) 300 MG capsule TAKE 1 CAPSULE BY MOUTH 2 TIMES A  capsule 1    amitriptyline (ELAVIL) 25 MG tablet TAKE 1 TABLET NIGHTLY 90 tablet 0    furosemide (LASIX) 40 MG tablet TAKE 1 TABLET ONCE DAILY 90 tablet 1    Upadacitinib ER (RINVOQ) 15 MG TB24 Take 1 tablet by mouth daily      hydroxychloroquine (PLAQUENIL) 200 MG tablet Take 1 tablet by mouth daily One in the AM and 1/2 a pill at bedtime.      famotidine (PEPCID) 10 MG tablet Take 1 tablet by mouth 2 times daily      ANDRAE, ZINGIBER OFFICINALIS, PO Take 1 tablet by mouth daily       No current facility-administered medications for this visit.     Current Outpatient Medications on File Prior to Visit   Medication Sig Dispense Refill    gabapentin (NEURONTIN) 300 MG capsule TAKE 1 CAPSULE BY MOUTH 2 TIMES A  capsule 1    amitriptyline (ELAVIL) 25 MG tablet TAKE 1 TABLET NIGHTLY 90 tablet 0    furosemide (LASIX) 40 MG tablet TAKE 1 TABLET ONCE DAILY 90 tablet 1    Upadacitinib ER (RINVOQ) 15 MG TB24 Take 1 tablet by mouth daily      hydroxychloroquine (PLAQUENIL) 200 MG tablet Take 1 tablet by mouth daily One in the AM and 1/2 a pill at bedtime.

## 2024-10-31 ENCOUNTER — HOSPITAL ENCOUNTER (OUTPATIENT)
Dept: MAMMOGRAPHY | Age: 74
Discharge: HOME | End: 2024-10-31
Payer: MEDICARE

## 2024-10-31 VITALS — WEIGHT: 165 LBS | BODY MASS INDEX: 31.15 KG/M2 | HEIGHT: 61 IN

## 2024-10-31 DIAGNOSIS — Z12.39 BREAST SCREENING: ICD-10-CM

## 2024-10-31 PROCEDURE — 77063 BREAST TOMOSYNTHESIS BI: CPT

## 2024-11-29 ENCOUNTER — PATIENT MESSAGE (OUTPATIENT)
Dept: PRIMARY CARE CLINIC | Age: 74
End: 2024-11-29

## 2024-12-26 ENCOUNTER — PATIENT MESSAGE (OUTPATIENT)
Dept: PRIMARY CARE CLINIC | Age: 74
End: 2024-12-26

## 2024-12-26 DIAGNOSIS — M54.50 CHRONIC LOW BACK PAIN WITHOUT SCIATICA, UNSPECIFIED BACK PAIN LATERALITY: ICD-10-CM

## 2024-12-26 DIAGNOSIS — G89.29 CHRONIC LOW BACK PAIN WITHOUT SCIATICA, UNSPECIFIED BACK PAIN LATERALITY: ICD-10-CM

## 2024-12-26 DIAGNOSIS — R60.0 PERIPHERAL EDEMA: ICD-10-CM

## 2024-12-26 RX ORDER — GABAPENTIN 300 MG/1
CAPSULE ORAL
Qty: 60 CAPSULE | Refills: 0 | Status: SHIPPED | OUTPATIENT
Start: 2024-12-26 | End: 2025-03-12

## 2024-12-26 RX ORDER — FUROSEMIDE 40 MG/1
40 TABLET ORAL DAILY
Qty: 90 TABLET | Refills: 1 | Status: SHIPPED | OUTPATIENT
Start: 2024-12-26

## 2024-12-26 NOTE — TELEPHONE ENCOUNTER
Medication:   Requested Prescriptions     Pending Prescriptions Disp Refills    gabapentin (NEURONTIN) 300 MG capsule 180 capsule 1     Sig: TAKE 1 CAPSULE BY MOUTH 2 TIMES A DAY    furosemide (LASIX) 40 MG tablet 90 tablet 1     Sig: Take 1 tablet by mouth daily     Last Filled:  09/30/24   10/27/23    Last appt: 10/17/2024   Next appt: Visit date not found    Last OARRS:       6/29/2020     5:11 PM   RX Monitoring   Periodic Controlled Substance Monitoring No signs of potential drug abuse or diversion identified.

## 2024-12-28 ENCOUNTER — PATIENT MESSAGE (OUTPATIENT)
Dept: PRIMARY CARE CLINIC | Age: 74
End: 2024-12-28

## 2025-02-03 ENCOUNTER — PATIENT MESSAGE (OUTPATIENT)
Dept: PRIMARY CARE CLINIC | Age: 75
End: 2025-02-03

## 2025-02-03 DIAGNOSIS — M54.50 CHRONIC LOW BACK PAIN WITHOUT SCIATICA, UNSPECIFIED BACK PAIN LATERALITY: ICD-10-CM

## 2025-02-03 DIAGNOSIS — G89.29 CHRONIC LOW BACK PAIN WITHOUT SCIATICA, UNSPECIFIED BACK PAIN LATERALITY: ICD-10-CM

## 2025-02-03 RX ORDER — GABAPENTIN 300 MG/1
CAPSULE ORAL
Qty: 180 CAPSULE | Refills: 0 | Status: SHIPPED | OUTPATIENT
Start: 2025-02-03 | End: 2025-04-20

## 2025-02-03 NOTE — TELEPHONE ENCOUNTER
Medication:   Requested Prescriptions     Pending Prescriptions Disp Refills    gabapentin (NEURONTIN) 300 MG capsule 60 capsule 0     Sig: TAKE 1 CAPSULE BY MOUTH 2 TIMES A DAY     Last Filled:  12/26/24     Last appt: 10/17/2024   Next appt: Visit date not found    Last OARRS:       6/29/2020     5:11 PM   RX Monitoring   Periodic Controlled Substance Monitoring No signs of potential drug abuse or diversion identified.   **90 day supply pended

## 2025-02-12 ENCOUNTER — PATIENT MESSAGE (OUTPATIENT)
Dept: PRIMARY CARE CLINIC | Age: 75
End: 2025-02-12

## 2025-02-13 SDOH — HEALTH STABILITY: PHYSICAL HEALTH: ON AVERAGE, HOW MANY DAYS PER WEEK DO YOU ENGAGE IN MODERATE TO STRENUOUS EXERCISE (LIKE A BRISK WALK)?: 1 DAY

## 2025-02-13 SDOH — HEALTH STABILITY: PHYSICAL HEALTH: ON AVERAGE, HOW MANY MINUTES DO YOU ENGAGE IN EXERCISE AT THIS LEVEL?: 20 MIN

## 2025-02-13 ASSESSMENT — LIFESTYLE VARIABLES
HOW OFTEN DO YOU HAVE A DRINK CONTAINING ALCOHOL: NEVER
HOW MANY STANDARD DRINKS CONTAINING ALCOHOL DO YOU HAVE ON A TYPICAL DAY: PATIENT DOES NOT DRINK
HOW MANY STANDARD DRINKS CONTAINING ALCOHOL DO YOU HAVE ON A TYPICAL DAY: 0
HOW OFTEN DO YOU HAVE SIX OR MORE DRINKS ON ONE OCCASION: 1
HOW OFTEN DO YOU HAVE A DRINK CONTAINING ALCOHOL: 1

## 2025-02-13 ASSESSMENT — PATIENT HEALTH QUESTIONNAIRE - PHQ9
SUM OF ALL RESPONSES TO PHQ QUESTIONS 1-9: 0
1. LITTLE INTEREST OR PLEASURE IN DOING THINGS: NOT AT ALL
SUM OF ALL RESPONSES TO PHQ9 QUESTIONS 1 & 2: 0
SUM OF ALL RESPONSES TO PHQ QUESTIONS 1-9: 0
2. FEELING DOWN, DEPRESSED OR HOPELESS: NOT AT ALL

## 2025-02-16 ENCOUNTER — PATIENT MESSAGE (OUTPATIENT)
Dept: PRIMARY CARE CLINIC | Age: 75
End: 2025-02-16

## 2025-02-16 DIAGNOSIS — G89.29 CHRONIC LOW BACK PAIN WITHOUT SCIATICA, UNSPECIFIED BACK PAIN LATERALITY: ICD-10-CM

## 2025-02-16 DIAGNOSIS — M54.50 CHRONIC LOW BACK PAIN WITHOUT SCIATICA, UNSPECIFIED BACK PAIN LATERALITY: ICD-10-CM

## 2025-02-16 SDOH — ECONOMIC STABILITY: FOOD INSECURITY: WITHIN THE PAST 12 MONTHS, YOU WORRIED THAT YOUR FOOD WOULD RUN OUT BEFORE YOU GOT MONEY TO BUY MORE.: NEVER TRUE

## 2025-02-16 SDOH — ECONOMIC STABILITY: INCOME INSECURITY: IN THE LAST 12 MONTHS, WAS THERE A TIME WHEN YOU WERE NOT ABLE TO PAY THE MORTGAGE OR RENT ON TIME?: NO

## 2025-02-16 SDOH — ECONOMIC STABILITY: FOOD INSECURITY: WITHIN THE PAST 12 MONTHS, THE FOOD YOU BOUGHT JUST DIDN'T LAST AND YOU DIDN'T HAVE MONEY TO GET MORE.: NEVER TRUE

## 2025-02-18 ENCOUNTER — OFFICE VISIT (OUTPATIENT)
Dept: PRIMARY CARE CLINIC | Age: 75
End: 2025-02-18

## 2025-02-18 VITALS
WEIGHT: 172.6 LBS | HEART RATE: 98 BPM | SYSTOLIC BLOOD PRESSURE: 138 MMHG | OXYGEN SATURATION: 98 % | DIASTOLIC BLOOD PRESSURE: 74 MMHG | BODY MASS INDEX: 32.59 KG/M2 | HEIGHT: 61 IN

## 2025-02-18 DIAGNOSIS — M35.00 H/O SJOGREN'S DISEASE: ICD-10-CM

## 2025-02-18 DIAGNOSIS — G89.29 CHRONIC LOW BACK PAIN WITHOUT SCIATICA, UNSPECIFIED BACK PAIN LATERALITY: ICD-10-CM

## 2025-02-18 DIAGNOSIS — Z85.3 HISTORY OF BREAST CANCER: ICD-10-CM

## 2025-02-18 DIAGNOSIS — M05.79 RHEUMATOID ARTHRITIS INVOLVING MULTIPLE SITES WITH POSITIVE RHEUMATOID FACTOR (HCC): ICD-10-CM

## 2025-02-18 DIAGNOSIS — Z00.00 MEDICARE ANNUAL WELLNESS VISIT, SUBSEQUENT: Primary | ICD-10-CM

## 2025-02-18 DIAGNOSIS — M54.50 CHRONIC LOW BACK PAIN WITHOUT SCIATICA, UNSPECIFIED BACK PAIN LATERALITY: ICD-10-CM

## 2025-02-18 DIAGNOSIS — M48.00 SPINAL STENOSIS, UNSPECIFIED SPINAL REGION: ICD-10-CM

## 2025-02-18 NOTE — PATIENT INSTRUCTIONS
least 3 days a week.  It can reduce your risk of falling.  Even if you have a hard time meeting the recommendations, it's better to be more active than less active. All activity done in each category counts toward your weekly total. You'd be surprised how daily things like carrying groceries, keeping up with grandchildren, and taking the stairs can add up.  What keeps you from being active?  If you've had a hard time being more active, you're not alone. Maybe you remember being able to do more. Or maybe you've never thought of yourself as being active. It's frustrating when you can't do the things you want. Being more active can help. What's holding you back?  Getting started.  Have a goal, but break it into easy tasks. Small steps build into big accomplishments.  Staying motivated.  If you feel like skipping your activity, remember your goal. Maybe you want to move better and stay independent. Every activity gets you one step closer.  Not feeling your best.  Start with 5 minutes of an activity you enjoy. Prove to yourself you can do it. As you get comfortable, increase your time.  You may not be where you want to be. But you're in the process of getting there. Everyone starts somewhere.  How can you find safe ways to stay active?  Talk with your doctor about any physical challenges you're facing. Make a plan with your doctor if you have a health problem or aren't sure how to get started with activity.  If you're already active, ask your doctor if there is anything you should change to stay safe as your body and health change.  If you tend to feel dizzy after you take medicine, avoid activity at that time. Try being active before you take your medicine. This will reduce your risk of falls.  If you plan to be active at home, make sure to clear your space before you get started. Remove things like TV cords, coffee tables, and throw rugs. It's safest to have plenty of space to move freely.  The key to getting more

## 2025-02-18 NOTE — TELEPHONE ENCOUNTER
Medication:   Requested Prescriptions     Pending Prescriptions Disp Refills    amitriptyline (ELAVIL) 25 MG tablet [Pharmacy Med Name: AMITRIPTYLIN TAB 25MG] 90 tablet 0     Sig: TAKE 1 TABLET NIGHTLY     Last Filled:  9.3.24    Last appt: 10/17/2024   Next appt: 2/18/2025  Pended to pt appt  Last OARRS:       6/29/2020     5:11 PM   RX Monitoring   Periodic Controlled Substance Monitoring No signs of potential drug abuse or diversion identified.

## 2025-02-18 NOTE — PROGRESS NOTES
Medicare Annual Wellness Visit    Nataliya Portillo is here for Medicare AWV    Assessment & Plan      Diagnosis Orders   1. Medicare annual wellness visit, subsequent        2. Chronic low back pain without sciatica, unspecified back pain laterality  amitriptyline (ELAVIL) 25 MG tablet      3. Spinal stenosis, unspecified spinal region        4. Rheumatoid arthritis involving multiple sites with positive rheumatoid factor (HCC)        5. History of breast cancer        6. H/O Sjogren's disease (HCC)               Subjective   The following acute and/or chronic problems were also addressed today:  Patient needs refill on her amitriptyline  Patient sees rheumatology has an appointment to see them next month stable  Patient sees pain management for chronic pain syndrome    Patient's complete Health Risk Assessment and screening values have been reviewed and are found in Flowsheets. The following problems were reviewed today and where indicated follow up appointments were made and/or referrals ordered.    Positive Risk Factor Screenings with Interventions:            Self-assessment of health:  In general, how would you say your health is?: (!) Poor    Interventions:  Trying her best to be active as much as she could with her pain    General HRA Questions:  Select all that apply: (!) New or Increased Pain  Interventions - Pain:  Patient sees pain management      Inactivity:  On average, how many days per week do you engage in moderate to strenuous exercise (like a brisk walk)?: 1 day (!) Abnormal  On average, how many minutes do you engage in exercise at this level?: 20 min  Interventions:  Patient declined any further interventions or treatment    Poor Eating Habits/Diet:  Do you eat balanced/healthy meals regularly?: (!) No  Interventions:  Healthier  diet    Abnormal BMI (obese):  Body mass index is 32.61 kg/m². (!) Abnormal  Interventions:  Patient declines any further evaluation or treatment           Safety:  Do

## 2025-04-27 DIAGNOSIS — M54.50 CHRONIC LOW BACK PAIN WITHOUT SCIATICA, UNSPECIFIED BACK PAIN LATERALITY: ICD-10-CM

## 2025-04-27 DIAGNOSIS — G89.29 CHRONIC LOW BACK PAIN WITHOUT SCIATICA, UNSPECIFIED BACK PAIN LATERALITY: ICD-10-CM

## 2025-04-28 RX ORDER — GABAPENTIN 300 MG/1
300 CAPSULE ORAL 2 TIMES DAILY
Qty: 60 CAPSULE | Refills: 0 | Status: SHIPPED | OUTPATIENT
Start: 2025-04-28 | End: 2025-05-28

## 2025-04-28 NOTE — TELEPHONE ENCOUNTER
Medication:   Requested Prescriptions     Pending Prescriptions Disp Refills    gabapentin (NEURONTIN) 300 MG capsule [Pharmacy Med Name: Gabapentin Oral Capsule 300 MG] 180 capsule 0     Sig: Take 1 capsule by mouth 2 times daily.     Last Filled:      Last appt: 2/18/2025   Next appt: Visit date not found    Last OARRS:       6/29/2020     5:11 PM   RX Monitoring   Periodic Controlled Substance Monitoring No signs of potential drug abuse or diversion identified.

## 2025-04-28 NOTE — TELEPHONE ENCOUNTER
PDMP Monitoring:    Last PDMP Ousmane as Reviewed:  Review User Review Instant Review Result   GARLAND GRANADOS 4/28/2025 10:35 AM Reviewed PDMP [1]     [unfilled]  Urine Drug Screenings (1 yr)    No resulted procedures found.       Medication Contract and Consent for Opioid Use Documents Filed        No documents found

## 2025-05-04 NOTE — PROGRESS NOTES
normal. No respiratory distress.      Breath sounds: Normal breath sounds. No wheezing, rhonchi or rales.   Musculoskeletal:      Right lower leg: No edema.      Left lower leg: No edema.   Skin:     General: Skin is warm and dry.      Findings: No rash.   Neurological:      Mental Status: She is alert and oriented to person, place, and time.      Motor: No weakness.      Gait: Gait normal.   Psychiatric:         Mood and Affect: Mood normal.         Behavior: Behavior normal.       EKG Interpretation: normal sinus rhythm, PAC's noted, unchanged from previous tracings.    Lab Review: Yes    ASSESSMENT:      1. Lumbar facet arthropathy  Ortho and pain management notes reviewed    2. Spinal stenosis of lumbar region, unspecified whether neurogenic claudication present    3. Pre-op examination  Chart reviewed H&P completed  Previous labs reviewed  EKG completed today.  PACs noted, however this is similar to previous tracings.  Advised to avoid NSAIDs x 7 days prior to procedure  Take Tylenol if needed  Okay to proceed with planned procedure  Postop anticoagulation per surgeons discretion  - EKG 12 lead    4. Chronic low back pain without sciatica, unspecified back pain laterality    5. Rheumatoid arthritis involving multiple sites with positive rheumatoid factor (HCC)  Continue per rheumatology.  Notes reviewed.  Recent labs reviewed    6. H/O Sjogren's disease    7. Gastroesophageal reflux disease without esophagitis  Stable    8. Chronic venous insufficiency    9. History of breast cancer    10. Hx of methicillin resistant Staphylococcus aureus       74 y.o. patient approved for Surgery        PLAN:  1. Preoperative workup as follows: ECG  2. Change in medication regimen before surgery:Discontinue NSAIDs (7) days before surgery  3. No contraindications to planned surgery    Electronically signed by MALATHI Reeder CNP on 5/8/2025 at 1:22 PM     This dictation was generated by voice recognition computer

## 2025-05-08 ENCOUNTER — OFFICE VISIT (OUTPATIENT)
Dept: PRIMARY CARE CLINIC | Age: 75
End: 2025-05-08

## 2025-05-08 VITALS
DIASTOLIC BLOOD PRESSURE: 84 MMHG | SYSTOLIC BLOOD PRESSURE: 124 MMHG | TEMPERATURE: 97.8 F | WEIGHT: 173 LBS | BODY MASS INDEX: 32.69 KG/M2 | HEART RATE: 93 BPM | OXYGEN SATURATION: 99 %

## 2025-05-08 DIAGNOSIS — M35.00 H/O SJOGREN'S DISEASE: ICD-10-CM

## 2025-05-08 DIAGNOSIS — I87.2 CHRONIC VENOUS INSUFFICIENCY: ICD-10-CM

## 2025-05-08 DIAGNOSIS — Z85.3 HISTORY OF BREAST CANCER: ICD-10-CM

## 2025-05-08 DIAGNOSIS — Z86.14 HX OF METHICILLIN RESISTANT STAPHYLOCOCCUS AUREUS: ICD-10-CM

## 2025-05-08 DIAGNOSIS — M54.50 CHRONIC LOW BACK PAIN WITHOUT SCIATICA, UNSPECIFIED BACK PAIN LATERALITY: ICD-10-CM

## 2025-05-08 DIAGNOSIS — G89.29 CHRONIC LOW BACK PAIN WITHOUT SCIATICA, UNSPECIFIED BACK PAIN LATERALITY: ICD-10-CM

## 2025-05-08 DIAGNOSIS — M47.816 LUMBAR FACET ARTHROPATHY: Primary | ICD-10-CM

## 2025-05-08 DIAGNOSIS — M48.061 SPINAL STENOSIS OF LUMBAR REGION, UNSPECIFIED WHETHER NEUROGENIC CLAUDICATION PRESENT: ICD-10-CM

## 2025-05-08 DIAGNOSIS — Z01.818 PRE-OP EXAMINATION: ICD-10-CM

## 2025-05-08 DIAGNOSIS — M05.79 RHEUMATOID ARTHRITIS INVOLVING MULTIPLE SITES WITH POSITIVE RHEUMATOID FACTOR (HCC): ICD-10-CM

## 2025-05-08 DIAGNOSIS — K21.9 GASTROESOPHAGEAL REFLUX DISEASE WITHOUT ESOPHAGITIS: ICD-10-CM

## 2025-05-08 ASSESSMENT — ENCOUNTER SYMPTOMS
DIARRHEA: 0
SHORTNESS OF BREATH: 0
BACK PAIN: 1
NAUSEA: 0
VOMITING: 0
COUGH: 0
WHEEZING: 0

## 2025-06-09 ENCOUNTER — PATIENT MESSAGE (OUTPATIENT)
Dept: PRIMARY CARE CLINIC | Age: 75
End: 2025-06-09

## 2025-06-09 DIAGNOSIS — M54.50 CHRONIC LOW BACK PAIN WITHOUT SCIATICA, UNSPECIFIED BACK PAIN LATERALITY: ICD-10-CM

## 2025-06-09 DIAGNOSIS — G89.29 CHRONIC LOW BACK PAIN WITHOUT SCIATICA, UNSPECIFIED BACK PAIN LATERALITY: ICD-10-CM

## 2025-06-09 RX ORDER — GABAPENTIN 300 MG/1
300 CAPSULE ORAL 2 TIMES DAILY
Qty: 180 CAPSULE | Refills: 0 | Status: SHIPPED | OUTPATIENT
Start: 2025-06-09 | End: 2025-07-09

## 2025-06-09 NOTE — TELEPHONE ENCOUNTER
Medication:   Requested Prescriptions     Pending Prescriptions Disp Refills    gabapentin (NEURONTIN) 300 MG capsule 180 capsule 0     Sig: Take 1 capsule by mouth 2 times daily for 30 days.     Last Filled:  4/28/25    Last appt: 5/8/2025   Next appt: Visit date not found    Last Labs DM:   Lab Results   Component Value Date/Time    LABA1C 5.0 07/22/2020 12:00 AM    LABA1C 5.0 07/22/2020 12:00 AM     Last Lipid:   Lab Results   Component Value Date/Time    CHOL 160 08/08/2023 03:57 PM    TRIG 60 08/08/2023 03:57 PM    HDL 50 08/08/2023 03:57 PM     Last PSA: No results found for: \"PSA\"  Last Thyroid:   Lab Results   Component Value Date/Time    TSH 1.82 06/10/2019 03:16 PM    TSH 1.69 05/11/2016 03:45 PM

## 2025-06-10 ENCOUNTER — OFFICE VISIT (OUTPATIENT)
Age: 75
End: 2025-06-10
Payer: MEDICARE

## 2025-06-10 ENCOUNTER — PROCEDURE VISIT (OUTPATIENT)
Age: 75
End: 2025-06-10
Payer: MEDICARE

## 2025-06-10 VITALS
SYSTOLIC BLOOD PRESSURE: 136 MMHG | HEART RATE: 96 BPM | WEIGHT: 170 LBS | HEIGHT: 61 IN | BODY MASS INDEX: 32.1 KG/M2 | OXYGEN SATURATION: 98 % | TEMPERATURE: 97.7 F | DIASTOLIC BLOOD PRESSURE: 83 MMHG

## 2025-06-10 DIAGNOSIS — H93.19 TINNITUS, UNSPECIFIED LATERALITY: Primary | ICD-10-CM

## 2025-06-10 DIAGNOSIS — H90.3 SENSORINEURAL HEARING LOSS (SNHL) OF BOTH EARS: ICD-10-CM

## 2025-06-10 DIAGNOSIS — H90.3 SENSORINEURAL HEARING LOSS (SNHL) OF BOTH EARS: Primary | ICD-10-CM

## 2025-06-10 DIAGNOSIS — H93.13 TINNITUS OF BOTH EARS: ICD-10-CM

## 2025-06-10 PROCEDURE — 1123F ACP DISCUSS/DSCN MKR DOCD: CPT | Performed by: OTOLARYNGOLOGY

## 2025-06-10 PROCEDURE — 92557 COMPREHENSIVE HEARING TEST: CPT

## 2025-06-10 PROCEDURE — 99204 OFFICE O/P NEW MOD 45 MIN: CPT | Performed by: OTOLARYNGOLOGY

## 2025-06-10 PROCEDURE — 92567 TYMPANOMETRY: CPT

## 2025-06-10 RX ORDER — VITAMIN B COMPLEX
2000 TABLET ORAL DAILY
COMMUNITY

## 2025-06-10 RX ORDER — MELATONIN 10 MG
CAPSULE ORAL
COMMUNITY

## 2025-06-10 RX ORDER — ASCORBIC ACID 500 MG
TABLET ORAL
COMMUNITY

## 2025-06-10 RX ORDER — NIACIN 500 MG
TABLET ORAL
COMMUNITY

## 2025-06-10 RX ORDER — SODIUM PHOSPHATE,MONO-DIBASIC 19G-7G/118
ENEMA (ML) RECTAL
COMMUNITY

## 2025-06-10 RX ORDER — DIMENHYDRINATE 50 MG
1000 TABLET ORAL DAILY
COMMUNITY

## 2025-06-10 RX ORDER — CYANOCOBALAMIN (VITAMIN B-12) 500 MCG
LOZENGE ORAL
COMMUNITY

## 2025-06-10 RX ORDER — VITAMIN A 3000 MCG
10000 CAPSULE ORAL
COMMUNITY

## 2025-06-10 RX ORDER — ACETAMINOPHEN 500 MG
TABLET ORAL
COMMUNITY

## 2025-06-10 RX ORDER — PSEUDOEPHEDRINE HCL 30 MG
400 TABLET ORAL 2 TIMES DAILY
COMMUNITY

## 2025-06-10 ASSESSMENT — ENCOUNTER SYMPTOMS
NAUSEA: 0
COUGH: 0
RHINORRHEA: 0
EYE PAIN: 0
SHORTNESS OF BREATH: 0
EYE REDNESS: 0
VOICE CHANGE: 0
CHOKING: 0
SINUS PAIN: 0
EYE ITCHING: 0
DIARRHEA: 0
FACIAL SWELLING: 0
SORE THROAT: 0
SINUS PRESSURE: 0
TROUBLE SWALLOWING: 0

## 2025-06-10 NOTE — PROGRESS NOTES
Hearing Sensitivity dB Range   Within Normal Limits (WNL) 0 - 20   Mild 20 - 40   Moderate 40 - 55   Moderately-Severe 55 - 70   Severe 70 - 90   Profound 90 +       Portions of this note were dictated using Dragon. There may be linguistic errors secondary to the use of this program.

## 2025-06-10 NOTE — PROGRESS NOTES
Subjective:      Patient ID: Nataliya Portillo is a 74 y.o. female.    HPI  Hearing Loss HPI  CC: tinnitus    General: Nataliya is a(n) 74 y.o. female who presents with tinnitus. A few years. Has found ways to tune out. Here for hearing eval.   How long: 3+ years  Side:bilateral  Prior audiogram:No  Previous episodes: no  Tinnitus:Yes  Otorrhea:No  Vertigo:No  Prior ear surgery: No  Ear trauma: No  History of hearing loss: No      Patient Active Problem List   Diagnosis    Incontinence of urine    Chronic back pain    Spinal stenosis    GERD (gastroesophageal reflux disease)    History of breast cancer    Chronic venous insufficiency    Rheumatoid arthritis involving multiple sites with positive rheumatoid factor (HCC)    H/O Sjogren's disease    History of Clostridioides difficile infection    Hx of methicillin resistant Staphylococcus aureus     Past Surgical History:   Procedure Laterality Date    BREAST BIOPSY      BREAST LUMPECTOMY      BREAST SURGERY      Right     CARPAL TUNNEL RELEASE  2012    Left    CARPAL TUNNEL RELEASE  2012    Right    COLONOSCOPY N/A 2020    COLONOSCOPY WITH BIOPSY performed by Jose Carlos MEJIA MD at [facility] ENDOSCOPY    EYE SURGERY      LEG SURGERY  2021    wound care [facility]    STOMACH SURGERY  10/07/2021    PUD  done @    UPPER GASTROINTESTINAL ENDOSCOPY N/A 2020    EGD DILATION BALLOON performed by Jose Carlos MEJIA MD at [facility] ENDOSCOPY     Family History   Problem Relation Age of Onset    Cancer Mother          at 96,     Osteoarthritis Mother         all joints affected as well as had several back surgeries    Migraines Mother         Mother always called them \"sick headaches\"    Stroke Father          at 88, 2013 (NOT from the stroke)    Hearing Loss Father         affected him the last several years of his life     Social History     Socioeconomic History    Marital status:      Spouse name: Mykel

## 2025-07-10 ENCOUNTER — PATIENT MESSAGE (OUTPATIENT)
Dept: PRIMARY CARE CLINIC | Age: 75
End: 2025-07-10

## 2025-07-10 DIAGNOSIS — R60.0 PERIPHERAL EDEMA: ICD-10-CM

## 2025-07-10 NOTE — TELEPHONE ENCOUNTER
Medication:   Requested Prescriptions     Pending Prescriptions Disp Refills    furosemide (LASIX) 40 MG tablet 90 tablet 1     Sig: Take 1 tablet by mouth daily     Last Filled:  12.26.24    Last appt: 5/8/2025   Next appt: Visit date not found    Last OARRS:       6/29/2020     5:11 PM   RX Monitoring   Periodic Controlled Substance Monitoring No signs of potential drug abuse or diversion identified.

## 2025-07-11 RX ORDER — FUROSEMIDE 40 MG/1
40 TABLET ORAL DAILY
Qty: 90 TABLET | Refills: 0 | Status: SHIPPED | OUTPATIENT
Start: 2025-07-11

## (undated) DEVICE — TRAP,MUCUS SPECIMEN, 80CC: Brand: MEDLINE

## (undated) DEVICE — SYRINGE INFL 60ML DISP ALLIANCE II

## (undated) DEVICE — CO2 CANNULA,SUPERSOFT, ADLT,7'O2,7'CO2: Brand: MEDLINE

## (undated) DEVICE — FORCEPS BX L240CM JAW DIA2.4MM ORNG L CAP W/ NDL DISP RAD

## (undated) DEVICE — FORCEPS BX L240CM JAW DIA2.8MM L CAP W/ NDL MIC MESH TOOTH

## (undated) DEVICE — CATHETER DIL 6FR L180CM BLLN INFLATED 36-40.5-45FR L8CM ES